# Patient Record
Sex: MALE | Race: WHITE | NOT HISPANIC OR LATINO | Employment: FULL TIME | URBAN - METROPOLITAN AREA
[De-identification: names, ages, dates, MRNs, and addresses within clinical notes are randomized per-mention and may not be internally consistent; named-entity substitution may affect disease eponyms.]

---

## 2019-04-01 NOTE — PROGRESS NOTES
"Electrophysiology Consult     Jay Nash Jr.  1965  [unfilled]  [unfilled]    04/03/19    DATE OF ADMISSION: (Not on file)  [facility] CARDIOLOGY    Augusto Chaney MD  [address] [address] / [facility] [address]    Chief Complaint   Patient presents with   • Irregular Heart Beat     Consult        Problem List:  1. Paroxysmal atrial fibrillation  a. CHADSVASc = 0, on ASA  b. 24-hour Holter monitor 1/30/2018: Predominantly normal sinus rhythm, average heart rate 58 bpm ( bpm)   c. 2 weeks Zio patch 7/5/2018 - 3% AF burden, average HR 53 bpm ( bpm), 5 beat run of NSVT  d. GXT 09/2018 - reportedly negative for ischemia - data deficit  e. Initiated on pill-in-pocket Flecainide  f. Echocardiogram 7/30/18: EF 60%, no significant valvular disease  2. PVC/NSVT  a. Noted on Zio patch, 07/2018, 5 beat run of NSVT  3. BPH  4. Tubular adenoma of rectum  5. Surgical history  a. Oral surgery      History of Present Illness:  Patient is a 53-year-old male with the above-noted past medical history who presents today in consultation by referral from Dr. Chaney for further evaluation of paroxysmal atrial fibrillation and consideration for ablation.  He also saw Dr. Morton at [place] and wishes for a second opinion.  He has noted intermittent episodes of palpitations starting a year and half ago.  He reportedly had episodes of atrial fibrillation noted on a 2 weeks Zio patch in July 2018 but we do not currently have the records of this.  He was initiated on pill in the pocket method of flecainide and is actually now on twice daily dosing scheduled for the last month due to continued episodes.  He underwent stress test in September which was negative for ischemia and had a normal echocardiogram.  Symptoms with atrial fibrillation include palpitations, \"uneasy feeling\" and are described as mildly severe in nature and occurs every 1-2 weeks and can last 10-12 hours in " duration.  Episodes/symptoms exacerbated by nothing and relieved occasionally by having a bowel movement in the morning.  He is a non-smoker, no caffeine, occasional ETOH.  No reflux symptoms.  No hx of HTN.  No s/s suggestive of DAX.  No hx of thyroid problems.  He is taking ASA daily.      No Known Allergies     Cannot display prior to admission medications because the patient has not been admitted in this contact.            Current Outpatient Medications:   •  aspirin 81 MG EC tablet, Take 81 mg by mouth Daily., Disp: , Rfl:   •  Cholecalciferol (VITAMIN D-3) 1000 units capsule, Take 2,000 Units by mouth Daily., Disp: , Rfl:   •  flecainide (TAMBOCOR) 50 MG tablet, Take 50 mg by mouth Every 12 (Twelve) Hours., Disp: , Rfl:   •  Multiple Vitamins-Minerals (MULTIVITAMIN ADULT PO), Take  by mouth Daily., Disp: , Rfl:     Social History     Socioeconomic History   • Marital status:      Spouse name: Not on file   • Number of children: Not on file   • Years of education: Not on file   • Highest education level: Not on file   Tobacco Use   • Smoking status: Never Smoker   • Smokeless tobacco: Never Used   Substance and Sexual Activity   • Alcohol use: Yes   • Drug use: No   • Sexual activity: Defer       Family History:  Mother: , age 79, pulmonary HTN, hx of atrial fibrillation  Father: Alive, age 80, atrial fibrillation    REVIEW OF SYSTEMS:   CONST:  No weight loss, fever, chills, weakness or fatigue.   HEENT:  No visual loss, blurred vision, double vision, yellow sclerae.                   No hearing loss, congestion, sore throat.   SKIN:      No rashes, urticaria, ulcers, sores.     RESP:     No shortness of breath, hemoptysis, cough, sputum.   GI:           No anorexia, nausea, vomiting, diarrhea. No abdominal pain, melena.   :         No burning on urination, hematuria or increased frequency.  ENDO:    No diaphoresis, cold or heat intolerance. No polyuria or polydipsia.   NEURO:  No headache,  "dizziness, + hx of syncope, no paralysis, ataxia, or parasthesias.                  No change in bowel or bladder control. No history of CVA/TIA  MUSC:    No muscle, back pain, joint pain or stiffness.   HEME:    No anemia, bleeding, bruising. No history of DVT/PE.  PSYCH:  No history of depression, anxiety    Vitals:    04/03/19 0849   BP: 112/72   BP Location: Right arm   Patient Position: Sitting   Pulse: 52   Weight: 96.2 kg (212 lb)   Height: 185.4 cm (73\")                 Physical Exam:  GEN: Well nourished, well-developed, no acute distress  HEENT: Normocephalic, atraumatic, PERRLA, moist mucous membranes  NECK: Supple, NO JVD, no thyromegaly, no lymphadenopathy  CARD: S1S2, RRR, no murmur, gallop, rub, PMI NL   LUNGS: Clear to auscultation, normal respiratory effort  ABDOMEN: Soft, nontender, normal bowel sounds  EXTREMITIES: No gross deformities, no clubbing, cyanosis, or edema  SKIN: Warm, dry, no lesions  NEURO: No focal deficits, alert and oriented x 3  PSYCHIATRIC: Normal affect and mood      I personally viewed and interpreted the patient's EKG/Telemetry/lab data      ECG 12 Lead  Date/Time: 4/3/2019 9:01 AM  Performed by: Helio Polanco MD  Authorized by: Helio Polanco MD   Comparison: not compared with previous ECG   Previous ECG: no previous ECG available  Rhythm: sinus bradycardia  BPM: 52                ICD-10-CM ICD-9-CM   1. Paroxysmal atrial fibrillation (CMS/[facility]) I48.0 427.31       Assessment and Plan:   1. Paroxysmal atrial fibrillation:  -Patient with recurrent, symptomatic episodes of paroxysmal atrial fibrillation despite current treatment with flecainide 50 mg bid.  3% burden per last event monitor in July.  Discussed options with patient including increasing Flecainide to 100 mg bid with repeat EKG 48 hours later versus using Flecainide as pill in the pocket.  Pill in the pocket approach would only decrease his duration of episodes but likely will do nothing in terms of his " frequency.  Last option would be to proceed with PVA.  Discussed risks, benefits and probable success rates.  - CHADSVASc = 0, on ASA.  Would need to start NOAC 4 weeks prior to PVA if he wishes to go this route.  Would use Eliquis PRN for any afib episode >1hr.      Scribed for Helio Polanco MD by Flori Fagan, MERA. 4/3/2019  9:02 AM     I, Helio Polanco MD, personally performed the services described in this documentation as scribed by the above named individual in my presence, and it is both accurate and complete.  4/3/2019  10:26 AM

## 2019-04-03 ENCOUNTER — CONSULT (OUTPATIENT)
Dept: CARDIOLOGY | Facility: CLINIC | Age: 54
End: 2019-04-03

## 2019-04-03 VITALS
HEART RATE: 52 BPM | DIASTOLIC BLOOD PRESSURE: 72 MMHG | SYSTOLIC BLOOD PRESSURE: 112 MMHG | WEIGHT: 212 LBS | HEIGHT: 73 IN | BODY MASS INDEX: 28.1 KG/M2

## 2019-04-03 DIAGNOSIS — I48.0 PAROXYSMAL ATRIAL FIBRILLATION (HCC): Primary | ICD-10-CM

## 2019-04-03 PROCEDURE — 99244 OFF/OP CNSLTJ NEW/EST MOD 40: CPT | Performed by: INTERNAL MEDICINE

## 2019-04-03 PROCEDURE — 93000 ELECTROCARDIOGRAM COMPLETE: CPT | Performed by: INTERNAL MEDICINE

## 2019-04-03 RX ORDER — ASPIRIN 81 MG/1
81 TABLET ORAL DAILY
COMMUNITY

## 2019-04-03 RX ORDER — FLECAINIDE ACETATE 50 MG/1
50 TABLET ORAL EVERY 12 HOURS
COMMUNITY
End: 2019-04-03 | Stop reason: ALTCHOICE

## 2019-04-03 RX ORDER — FLECAINIDE ACETATE 100 MG/1
100 TABLET ORAL 2 TIMES DAILY
Qty: 60 TABLET | Refills: 11 | Status: SHIPPED | OUTPATIENT
Start: 2019-04-03 | End: 2020-04-27

## 2019-04-03 RX ORDER — CHOLECALCIFEROL (VITAMIN D3) 25 MCG
2000 CAPSULE ORAL DAILY
COMMUNITY
End: 2020-06-09

## 2019-06-21 ENCOUNTER — TELEPHONE (OUTPATIENT)
Dept: CARDIOLOGY | Facility: CLINIC | Age: 54
End: 2019-06-21

## 2019-06-21 NOTE — TELEPHONE ENCOUNTER
Farrukh Archibald, Cheryl Rep  Karon Max, RN             This patient's medication has been working and he does not want to be on the list for PVA at this time. I advised patient to call office if anything changes. Can you document this in patient's chart please? Thanks

## 2020-04-27 RX ORDER — FLECAINIDE ACETATE 100 MG/1
100 TABLET ORAL 2 TIMES DAILY
Qty: 60 TABLET | Refills: 2 | Status: SHIPPED | OUTPATIENT
Start: 2020-04-27 | End: 2020-07-27 | Stop reason: SDUPTHER

## 2020-06-08 NOTE — PROGRESS NOTES
[facility] at [facility]   OFFICE NOTE      Jay Nash Jr.  1965  PCP: Augusto Chaney MD    SUBJECTIVE:   Jay Nash Jr. is a 54 y.o. male seen for a follow up visit regarding the following:     CC:Afib    HPI:   Pleasant 54-year-old gent presents today follow-up regarding Intermatic atrial fibrillation.  Mr. Nash states since increasing his Tambocor to 100 g every 12 hours he continues to have breakthrough episodes atrial fibrillation occurring once a week.  This typically last a couple hours resolves with some.  When he is in atrial fibrillation he feels irritated and he has palpitations.  He does not always feel like working out when he is in A. fib.  He does take Eliquis when he has episode.  Otherwise he is taking aspirin daily.  He denies any sudden syncope events unrelated.  Occasionally if he is in A. fib try to stand to quickly get a little dizzy.  He has not any chest pain or chest tightness with exertion stressing his pectoris.  He can ride a bike for up to 30-40 miles a day and feels fine without any symptoms.    Cardiac PMH: (Old records have been reviewed and summarized below)  1. Paroxysmal atrial fibrillation  a. CHADSVASc = 0, on ASA  b. 24-hour Holter monitor 1/30/2018: Predominantly normal sinus rhythm, average heart rate 58 bpm ( bpm)   c. 2 weeks Zio patch 7/5/2018 - 3% AF burden, average HR 53 bpm ( bpm), 5 beat run of NSVT  d. GXT 09/2018 - reportedly negative for ischemia - data deficit  e. Initiated on pill-in-pocket Flecainide  f. Echocardiogram 7/30/18: EF 60%, no significant valvular disease  2. PVC/NSVT  a. Noted on Zio patch, 07/2018, 5 beat run of NSVT  3. BPH  4. Tubular adenoma of rectum  5. Surgical history  a. Oral surgery    Past Medical History, Past Surgical History, Family history, Social History, and Medications were all reviewed with the patient today and updated as necessary.       Current Outpatient Medications:   •   "aspirin 81 MG EC tablet, Take 81 mg by mouth Daily., Disp: , Rfl:   •  flecainide (TAMBOCOR) 100 MG tablet, Take 1 tablet by mouth 2 (Two) Times a Day. Please make appointment for further refills., Disp: 60 tablet, Rfl: 2  •  Multiple Vitamins-Minerals (MULTIVITAMIN ADULT PO), Take  by mouth Daily., Disp: , Rfl:   •  silodosin (RAPAFLO) 4 MG capsule capsule, Take 4 mg by mouth Daily With Breakfast., Disp: , Rfl:       No Known Allergies  Patient Active Problem List   Diagnosis   • Paroxysmal atrial fibrillation (CMS/HCC)     Past Medical History:   Diagnosis Date   • Abnormal heart rhythm    • Atrial fibrillation (CMS/HCC)      Past Surgical History:   Procedure Laterality Date   • KNEE SURGERY      LEFT     History reviewed. No pertinent family history.  Social History     Tobacco Use   • Smoking status: Never Smoker   • Smokeless tobacco: Never Used   Substance Use Topics   • Alcohol use: Yes       ROS:  Review of Symptoms:  General: no recent weight loss/gain, weakness or fatigue  Skin: no rashes, lumps, or other skin changes  HEENT: no dizziness, lightheadedness, or vision changes  Respiratory: no cough or hemoptysis  Cardiovascular: no palpitations, and tachycardia  Gastrointestinal: no black/tarry stools or diarrhea  Urinary: no change in frequency or urgency  Peripheral Vascular: no claudication or leg cramps  Musculoskeletal: no muscle or joint pain/stiffness  Psychiatric: no depression or excessive stress  Neurological: no sensory or motor loss, no syncope  Hematologic: no anemia, easy bruising or bleeding  Endocrine: no thyroid problems, nor heat or cold intolerance    PHYSICAL EXAM:    /70 (BP Location: Right arm, Patient Position: Sitting)   Pulse 61   Temp 97.4 °F (36.3 °C)   Ht 185.4 cm (73\")   Wt 94.1 kg (207 lb 6.4 oz)   SpO2 96%   BMI 27.36 kg/m²        Wt Readings from Last 5 Encounters:   06/09/20 94.1 kg (207 lb 6.4 oz)   04/03/19 96.2 kg (212 lb)       BP Readings from Last 5 " Encounters:   06/09/20 124/70   04/03/19 112/72       General appearance - Alert, well appearing, and in no distress   Mental status - Affect appropriate to mood.  Eyes - Sclerae anicteric,  ENMT - Hearing grossly normal bilaterally, Dental hygiene good.  Neck - Carotids upstroke normal bilaterally, no bruits, no JVD.  Resp - Clear to auscultation, no wheezes, rales or rhonchi, symmetric air entry.  Heart - Normal rate, regular rhythm, normal S1, S2, no murmurs, rubs, clicks or gallops.  GI - Soft, nontender, nondistended, no masses or organomegaly.  Neurological - Grossly intact - normal speech, no focal findings  Musculoskeletal - No joint tenderness, deformity or swelling, no muscular tenderness noted.  Extremities - Peripheral pulses normal, no pedal edema, no clubbing or cyanosis.  Skin - Normal coloration and turgor.  Psych -  oriented to person, place, and time.    Medical problems and test results were reviewed with the patient today.     No results found for this or any previous visit (from the past 672 hour(s)).      EKG: (EKG has been independently visualized by me and summarized below)    ECG 12 Lead  Date/Time: 6/9/2020 4:27 PM  Performed by: Martín Barajas PA  Authorized by: Martín Barajas PA   Comparison: compared with previous ECG from 4/3/2019  Similar to previous ECG  Rhythm: sinus rhythm  Rate: normal  Conduction: conduction normal  ST Segments: ST segments normal  T Waves: T waves normal  QRS axis: normal  Other: no other findings    Clinical impression: normal ECG              ASSESSMENT   1. PaF: Flecainide  100mg BID. EKG stable.   2. Chadsvasc=0, Asa daily. PRN Eliquis.     PLAN  · Long discussion with patient regarding options of treatment of symptomatic atrial fibrillation.  He continues to have breakthrough episodes of 100 mg flecainide twice daily.  We Discussed in detail the risk and benefits of a pulmonary vein ablation procedure.  He like to consider this in the near future.     · Return for Dr. Polanco in 6 months or sooner as needed.      6/9/2020  15:30    Will Jenn LOONEY

## 2020-06-09 ENCOUNTER — OFFICE VISIT (OUTPATIENT)
Dept: CARDIOLOGY | Facility: CLINIC | Age: 55
End: 2020-06-09

## 2020-06-09 VITALS
WEIGHT: 207.4 LBS | DIASTOLIC BLOOD PRESSURE: 70 MMHG | OXYGEN SATURATION: 96 % | BODY MASS INDEX: 27.49 KG/M2 | HEART RATE: 61 BPM | TEMPERATURE: 97.4 F | SYSTOLIC BLOOD PRESSURE: 124 MMHG | HEIGHT: 73 IN

## 2020-06-09 DIAGNOSIS — I48.0 PAROXYSMAL ATRIAL FIBRILLATION (HCC): Primary | ICD-10-CM

## 2020-06-09 PROCEDURE — 93000 ELECTROCARDIOGRAM COMPLETE: CPT | Performed by: PHYSICIAN ASSISTANT

## 2020-06-09 PROCEDURE — 99214 OFFICE O/P EST MOD 30 MIN: CPT | Performed by: PHYSICIAN ASSISTANT

## 2020-06-09 RX ORDER — SILODOSIN 4 MG/1
4 CAPSULE ORAL
COMMUNITY
End: 2021-10-29

## 2020-06-22 DIAGNOSIS — I48.0 PAROXYSMAL ATRIAL FIBRILLATION (HCC): Primary | ICD-10-CM

## 2020-07-27 RX ORDER — FLECAINIDE ACETATE 100 MG/1
100 TABLET ORAL 2 TIMES DAILY
Qty: 60 TABLET | Refills: 6 | Status: SHIPPED | OUTPATIENT
Start: 2020-07-27 | End: 2020-08-11 | Stop reason: SDUPTHER

## 2020-08-11 RX ORDER — FLECAINIDE ACETATE 100 MG/1
100 TABLET ORAL 2 TIMES DAILY
Qty: 180 TABLET | Refills: 3 | Status: SHIPPED | OUTPATIENT
Start: 2020-08-11 | End: 2021-07-20

## 2021-01-27 ENCOUNTER — OFFICE VISIT (OUTPATIENT)
Dept: CARDIOLOGY | Facility: CLINIC | Age: 56
End: 2021-01-27

## 2021-01-27 VITALS
OXYGEN SATURATION: 99 % | HEART RATE: 51 BPM | BODY MASS INDEX: 29.1 KG/M2 | WEIGHT: 219.6 LBS | HEIGHT: 73 IN | DIASTOLIC BLOOD PRESSURE: 64 MMHG | SYSTOLIC BLOOD PRESSURE: 118 MMHG | TEMPERATURE: 96.2 F

## 2021-01-27 DIAGNOSIS — I49.3 PVC (PREMATURE VENTRICULAR CONTRACTION): ICD-10-CM

## 2021-01-27 DIAGNOSIS — I48.0 PAROXYSMAL ATRIAL FIBRILLATION (HCC): Primary | ICD-10-CM

## 2021-01-27 PROCEDURE — 99213 OFFICE O/P EST LOW 20 MIN: CPT | Performed by: INTERNAL MEDICINE

## 2021-01-27 PROCEDURE — 93000 ELECTROCARDIOGRAM COMPLETE: CPT | Performed by: INTERNAL MEDICINE

## 2021-01-27 RX ORDER — DOXAZOSIN MESYLATE 4 MG/1
4 TABLET ORAL NIGHTLY
COMMUNITY

## 2021-01-27 NOTE — PROGRESS NOTES
Jay Nash Jr.  1965  [phone]    01/27/2021    [facility] CARDIOLOGY     Referring Provider: No ref. provider found     Augusto Chaney MD  [address] [address]    Chief Complaint   Patient presents with   • Atrial Fibrillation       Problem List:   1. Paroxysmal atrial fibrillation  a. CHADSVASc = 0, on ASA  b. 24-hour Holter monitor 1/30/2018: Predominantly normal sinus rhythm, average heart rate 58 bpm ( bpm)   c. 2 weeks Zio patch 7/5/2018 - 3% AF burden, average HR 53 bpm ( bpm), 5 beat run of NSVT  d. GXT 09/2018 - reportedly negative for ischemia - data deficit  e. Initiated on pill-in-pocket Flecainide  f. Echocardiogram 7/30/18: EF 60%, no significant valvular disease  2. PVC/NSVT  a. Noted on Zio patch, 07/2018, 5 beat run of NSVT  3. BPH  4. Tubular adenoma of rectum  5. Surgical history  a. Oral surgery      Allergies  No Known Allergies    Current Medications    Current Outpatient Medications:   •  aspirin 81 MG EC tablet, Take 81 mg by mouth Daily., Disp: , Rfl:   •  doxazosin (Cardura) 4 MG tablet, Take 4 mg by mouth Every Night., Disp: , Rfl:   •  flecainide (TAMBOCOR) 100 MG tablet, Take 1 tablet by mouth 2 (Two) Times a Day., Disp: 180 tablet, Rfl: 3  •  Multiple Vitamins-Minerals (MULTIVITAMIN ADULT PO), Take  by mouth Daily., Disp: , Rfl:   •  silodosin (RAPAFLO) 4 MG capsule capsule, Take 4 mg by mouth Daily With Breakfast., Disp: , Rfl:     History of Present Illness     Pt presents for follow up of AF/PVC. Since we last saw the pt, pt denies any SOB, CP, LH, and dizziness. Denies any hospitalizations, ER visits, bleeding, or TIA/CVA symptoms. Overall feels well. No episode of AF for past 7-8 months. Presently being treated with omeprazole 40 mg po daily for gastritis    ROS:  General:  Denies fatigue, weight gain or loss  Cardiovascular:  Denies CP, PND, syncope, near syncope, edema or palpitations.  Pulmonary:   "Denies VILLALBA, cough, or wheezing      Vitals:    01/27/21 1511   BP: 118/64   BP Location: Left arm   Patient Position: Sitting   Pulse: 51   Temp: 96.2 °F (35.7 °C)   SpO2: 99%   Weight: 99.6 kg (219 lb 9.6 oz)   Height: 185.4 cm (72.99\")     Body mass index is 28.98 kg/m².  PE:  General: NAD  Neck: no JVD, no carotid bruits, no TM  Heart RRR, NL S1, S2, S4 present, no rubs, murmurs  Lungs: CTA, no wheezes, rhonchi, or rales  Abd: soft, non-tender, NL BS  Ext: No musculoskeletal deformities, no edema, cyanosis, or clubbing  Psych: normal mood and affect    Diagnostic Data:        ECG 12 Lead    Date/Time: 1/27/2021 3:16 PM  Performed by: Helio Polanco MD  Authorized by: Helio Polanco MD   Comparison: compared with previous ECG from 6/9/2020  Similar to previous ECG  Rhythm: sinus bradycardia  BPM: 50              1. Paroxysmal atrial fibrillation (CMS/HCC)    2. PVC (premature ventricular contraction)          Plan:  1. PAF: Flecainide  100mg BID. EKG stable. Well controlled overall  2. PVC: stable  3. Chadsvasc=0, ASA daily. PRN Eliquis.    F/up in 12 months      "

## 2021-07-20 RX ORDER — FLECAINIDE ACETATE 100 MG/1
TABLET ORAL
Qty: 180 TABLET | Refills: 1 | Status: SHIPPED | OUTPATIENT
Start: 2021-07-20 | End: 2022-01-17

## 2021-08-02 ENCOUNTER — OFFICE VISIT (OUTPATIENT)
Dept: CARDIOLOGY | Facility: CLINIC | Age: 56
End: 2021-08-02

## 2021-08-02 VITALS
OXYGEN SATURATION: 96 % | WEIGHT: 211 LBS | BODY MASS INDEX: 27.08 KG/M2 | DIASTOLIC BLOOD PRESSURE: 62 MMHG | HEART RATE: 54 BPM | SYSTOLIC BLOOD PRESSURE: 102 MMHG | HEIGHT: 74 IN

## 2021-08-02 DIAGNOSIS — I48.0 PAROXYSMAL ATRIAL FIBRILLATION (HCC): Primary | ICD-10-CM

## 2021-08-02 PROCEDURE — 93000 ELECTROCARDIOGRAM COMPLETE: CPT | Performed by: PHYSICIAN ASSISTANT

## 2021-08-02 PROCEDURE — 99214 OFFICE O/P EST MOD 30 MIN: CPT | Performed by: PHYSICIAN ASSISTANT

## 2021-08-02 NOTE — PROGRESS NOTES
[facility] at [facility]   OFFICE NOTE      Jay Nash Jr.  1965  PCP: Augusto Chaney MD    SUBJECTIVE:   Jay Nash Jr. is a 55 y.o. male seen for a follow up visit regarding the following:     CC:Afib    HPI:   Since we last saw the pt, pt denies any AF episodes, SOB, CP, LH, and dizziness. Denies any hospitalizations, ER visits, bleeding, or TIA/CVA symptoms. Overall feels well.  He cycles 25 to 50 miles per week.  He has had no symptoms while cycling.  Tolerating flecainide therapy well.    Cardiac PMH: (Old records have been reviewed and summarized below)  1. Paroxysmal atrial fibrillation  a. CHADSVASc = 0, on ASA  b. 24-hour Holter monitor 1/30/2018: Predominantly normal sinus rhythm, average heart rate 58 bpm ( bpm)   c. 2 weeks Zio patch 7/5/2018 - 3% AF burden, average HR 53 bpm ( bpm), 5 beat run of NSVT  d. GXT 09/2018 - reportedly negative for ischemia - data deficit  e. Initiated on pill-in-pocket Flecainide  f. Echocardiogram 7/30/18: EF 60%, no significant valvular disease  2. PVC/NSVT  a. Noted on Zio patch, 07/2018, 5 beat run of NSVT  3. BPH  4. Tubular adenoma of rectum  5. Surgical history  a. Oral surgery          Past Medical History, Past Surgical History, Family history, Social History, and Medications were all reviewed with the patient today and updated as necessary.       Current Outpatient Medications:   •  aspirin 81 MG EC tablet, Take 81 mg by mouth Daily., Disp: , Rfl:   •  doxazosin (Cardura) 4 MG tablet, Take 4 mg by mouth Every Night., Disp: , Rfl:   •  flecainide (TAMBOCOR) 100 MG tablet, TAKE 1 TABLET TWICE A DAY, Disp: 180 tablet, Rfl: 1  •  Multiple Vitamins-Minerals (MULTIVITAMIN ADULT PO), Take  by mouth Daily., Disp: , Rfl:   •  silodosin (RAPAFLO) 4 MG capsule capsule, Take 4 mg by mouth Daily With Breakfast., Disp: , Rfl:       No Known Allergies  Patient Active Problem List   Diagnosis   • Paroxysmal atrial fibrillation  "(CMS/HCC)     Past Medical History:   Diagnosis Date   • Abnormal heart rhythm    • Atrial fibrillation (CMS/HCC)      Past Surgical History:   Procedure Laterality Date   • KNEE SURGERY      LEFT     History reviewed. No pertinent family history.  Social History     Tobacco Use   • Smoking status: Never Smoker   • Smokeless tobacco: Never Used   Substance Use Topics   • Alcohol use: Yes       ROS:  Review of Symptoms:  General: no recent weight loss/gain, weakness or fatigue  Skin: no rashes, lumps, or other skin changes  HEENT: no dizziness, lightheadedness, or vision changes  Respiratory: no cough or hemoptysis  Cardiovascular: no palpitations, and tachycardia  Gastrointestinal: no black/tarry stools or diarrhea  Urinary: no change in frequency or urgency  Peripheral Vascular: no claudication or leg cramps  Musculoskeletal: no muscle or joint pain/stiffness  Psychiatric: no depression or excessive stress  Neurological: no sensory or motor loss, no syncope  Hematologic: no anemia, easy bruising or bleeding  Endocrine: no thyroid problems, nor heat or cold intolerance    PHYSICAL EXAM:    /62 (BP Location: Left arm, Patient Position: Sitting)   Pulse 54   Ht 188 cm (74\")   Wt 95.7 kg (211 lb)   SpO2 96%   BMI 27.09 kg/m²        Wt Readings from Last 5 Encounters:   08/02/21 95.7 kg (211 lb)   01/27/21 99.6 kg (219 lb 9.6 oz)   06/09/20 94.1 kg (207 lb 6.4 oz)   04/03/19 96.2 kg (212 lb)       BP Readings from Last 5 Encounters:   08/02/21 102/62   01/27/21 118/64   06/09/20 124/70   04/03/19 112/72       General appearance - Alert, well appearing, and in no distress   Mental status - Affect appropriate to mood.  Eyes - Sclerae anicteric,  ENMT - Hearing grossly normal bilaterally, Dental hygiene good.  Neck - Carotids upstroke normal bilaterally, no bruits, no JVD.  Resp - Clear to auscultation, no wheezes, rales or rhonchi, symmetric air entry.  Heart - Normal rate, regular rhythm, normal S1, S2, no " murmurs, rubs, clicks or gallops.  GI - Soft, nontender, nondistended, no masses or organomegaly.  Neurological - Grossly intact - normal speech, no focal findings  Musculoskeletal - No joint tenderness, deformity or swelling, no muscular tenderness noted.  Extremities - Peripheral pulses normal, no pedal edema, no clubbing or cyanosis.  Skin - Normal coloration and turgor.  Psych -  oriented to person, place, and time.    Medical problems and test results were reviewed with the patient today.     No results found for this or any previous visit (from the past 672 hour(s)).      EKG: (EKG has been independently visualized by me and summarized below)    ECG 12 Lead    Date/Time: 8/2/2021 12:07 PM  Performed by: Martín Barajas PA  Authorized by: Martín Barajas PA   Rhythm: sinus rhythm  Rate: normal  Conduction: 1st degree AV block  ST Segments: ST segments normal  QRS axis: normal  Other: no other findings    Clinical impression: normal ECG            ASSESSMENT   1. PAF: Flecainide 100mg BID. EKG stable. First degree AVB  2. PVC's Stable  3. Cahdsvasc=o, Asa daily.     PLAN  · Continue current medical therapy, EKG stable on flecainide use  · Return for follow-up in 6 months with Dr. Polanco scheduled    8/2/2021  09:39 EDT  Will Jenn LOONEY

## 2021-10-29 ENCOUNTER — OFFICE VISIT (OUTPATIENT)
Dept: ORTHOPEDIC SURGERY | Facility: CLINIC | Age: 56
End: 2021-10-29

## 2021-10-29 VITALS — WEIGHT: 202.6 LBS | BODY MASS INDEX: 26 KG/M2 | OXYGEN SATURATION: 98 % | HEART RATE: 51 BPM | HEIGHT: 74 IN

## 2021-10-29 DIAGNOSIS — M77.41 METATARSALGIA OF RIGHT FOOT: Primary | ICD-10-CM

## 2021-10-29 PROCEDURE — 99204 OFFICE O/P NEW MOD 45 MIN: CPT | Performed by: ORTHOPAEDIC SURGERY

## 2021-10-29 RX ORDER — MELOXICAM 15 MG/1
15 TABLET ORAL DAILY
Qty: 30 TABLET | Refills: 5 | Status: SHIPPED | OUTPATIENT
Start: 2021-10-29 | End: 2023-01-11

## 2021-10-29 NOTE — PROGRESS NOTES
NEW PATIENT    Patient: Jay Nash Jr.  : 1965    Primary Care Provider: Augusto Chaney MD    Requesting Provider: As above    Pain of the Right Foot      History    Chief Complaint: Right foot pain    History of Present Illness: This is a very pleasant 56-year-old gentleman who has about a 1 year history of right forefoot pain.  He reports that gets much worse barefoot, feels better with padding, it feels as if he is walking on something.  He did not have any injury, he thinks it might have started secondary to long distance bicycling.  He has tried various types of pads, he has a silicone Budin splint type pad that helps a little bit.  He reports that if he wears his sandals that have a gel liner he has no symptoms.  He has been seen by podiatry and thought to have 2 neuromas.  He had 1 injection in the second webspace by history, he reports it did not help seem to make it worse.  He has an MRI on disc that I reviewed and reviewed the report.  It is dated 2020.  It shows moderate hallux valgus with some arthritic change in the first MTPJ, I do not see a neuroma, there is slight inflammation in the soft tissues around the second and third MTPJ.  This is my interpretation.  He is here for another opinion.  He has not had any custom orthotics.  He is an  and .    Current Outpatient Medications on File Prior to Visit   Medication Sig Dispense Refill   • aspirin 81 MG EC tablet Take 81 mg by mouth Daily.     • doxazosin (Cardura) 4 MG tablet Take 4 mg by mouth Every Night.     • flecainide (TAMBOCOR) 100 MG tablet TAKE 1 TABLET TWICE A  tablet 1   • Multiple Vitamins-Minerals (MULTIVITAMIN ADULT PO) Take  by mouth Daily.     • [DISCONTINUED] silodosin (RAPAFLO) 4 MG capsule capsule Take 4 mg by mouth Daily With Breakfast.       No current facility-administered medications on file prior to visit.      No Known Allergies   Past Medical History:   Diagnosis Date   •  "Abnormal heart rhythm    • Atrial fibrillation (HCC)      Past Surgical History:   Procedure Laterality Date   • KNEE SURGERY      LEFT     No family history on file.   Social History     Socioeconomic History   • Marital status:    Tobacco Use   • Smoking status: Never Smoker   • Smokeless tobacco: Never Used   Substance and Sexual Activity   • Alcohol use: Yes   • Drug use: No   • Sexual activity: Defer        Review of Systems   Constitutional: Negative.    HENT: Negative.    Eyes: Negative.    Respiratory: Negative.    Cardiovascular: Negative.    Gastrointestinal: Negative.    Endocrine: Negative.    Genitourinary: Negative.    Musculoskeletal: Positive for arthralgias.   Skin: Negative.    Allergic/Immunologic: Negative.    Neurological: Negative.    Hematological: Negative.    Psychiatric/Behavioral: Negative.        The following portions of the patient's history were reviewed and updated as appropriate: allergies, current medications, past family history, past medical history, past social history, past surgical history and problem list.    Physical Exam:   Pulse 51   Ht 188 cm (74.02\")   Wt 91.9 kg (202 lb 9.6 oz)   SpO2 98%   BMI 26.00 kg/m²   GENERAL: Body habitus: normal weight for height    Lower extremity edema: Right: none; Left: none    Varicose veins:  Right: none; Left: none    Gait: normal     Mental Status:  awake and alert; oriented to person, place, and time    Voice:  clear  SKIN:  Lower extremity: Normal    Hair Growth(lower extremity):  Right:normal; Left:  normal  NAILS: Toenails: normal  HEENT: Head: Normocephalic, atraumatic,  without obvious abnormality.  eye: normal external eye, no icterus  ears:normal external ears  PULM:  Repiratory effort normal  CV:  Dorsalis Pedis:  Right: 2+; Left:2+    Posterior Tibial: Right:2+; Left:2+    Capillary Refill:  Brisk  MSK:  Hand:sensation intact      Tibia:  Right:  non tender; Left:  non tender      Ankle:  Right: non tender, ROM  " normal and motor function  normal; Left:  non tender, ROM  normal and motor function  normal      Foot:  Right:  Moderate hallux valgus metatarsus primus varus, no tenderness over the bunion, second and third toes are longer than the great toe, slight flexion contracture at the second and third toe PIP joint, tender to palpation in the second and third metatarsal phalangeal joints, third more tender than the second, no significant swelling, no instability, otherwise nontender; Left:  No tenderness, similar long second and third toes, much less hallux valgus metatarsus primus varus      NEURO: Heel Walking:  Right:  normal; Left:  normal    Toe Walking:  Right:  Able to do this with pain in the second and third metatarsal phalangeal joints; Left:  normal     [place] 5.07 monofilament test: normal    Lower extremity sensation: intact       Calf Atrophy:none    Motor Function: all 5/5         Medical Decision Making    Data Review:   reviewed radiology images, reviewed radiology results and reviewed outside records    Assessment and Plan/ Diagnosis/Treatment options:   1. Metatarsalgia of right foot  I explained to him I do not think this is a neuroma.  This is metatarsalgia.  I explained to the patient that this is a very common problem, it is commonly hereditary but is made worse by shoe wear.  It commonly presents as a feeling of walking on a marble, or a rock.  It is generally worse barefoot, feels better with padding .  It can be hard to distinguish this from neuroma pain, but neuroma pain is generally worse in a shoe and better barefoot.    If untreated, it can lead to severe deformity of the toe.      I explained the problem to the patient, that the swelling in the joints leads to loosening of the ligaments and gradual deformity.  I explained that the toe can move directly up or sideways and become a crossover toe.  I explained we can call this synovitis, metatarsalgia, early hammertoe, early crossover  toe, the result is the same.  The goal of treatment is to prevent severe deformity.  We cannot change the deformity that has already occurred, but we can keep it from getting worse.  I explained that I generally tell patients that I do not do an MRI for this, but since he had an MRI already done and it shows the mild inflammation around the joints as expected.    I explained it can take 6-12 months for the pain to resolve.  When the pain is gone, the toe will no longer deform.    The treatment is done in a step-wise fashion.  The first stage is three fold: 1. splinting the joint, I showed them how to use either tape or a 2 loop Budin splint.  They should experiment and use whichever is comfortable.  2. custom orthotic to relieve pressure on the joint- I gave them a prescription 3. antiinflammatory to decrease the inflammation, Mobic.  I also placed a metatarsal pad in his shoe he initially thought that it was painful I used a large pad, I gave him a small and medium pad to try instead, I explained he should adjusted until it is comfortable for metatarsal posting.    The second stage is a cortisone injection I do not do this the first time because of the increased risk that the toe will dislocate after injection.  He has already had an injection in the webspace I suspect not the joint.  I explained that sometimes these joints can spontaneously dislocate.   .      Surgery is only a very last resort.      I will see him in 3 to 4 months standing 2 views of the foot        Radiology Ordered []  Radiology Reports Reviewed []      Radiology Images Reviewed []   Labs Reviewed []    Labs Ordered []   PCP Records Reviewed []    Provider Records Reviewed []    ER Records Reviewed []    Hospital Records Reviewed []    History Obtained From Family []    Phone conversation with Provider []    Records Requested []        Wanda Grant MD

## 2022-01-17 RX ORDER — FLECAINIDE ACETATE 100 MG/1
TABLET ORAL
Qty: 180 TABLET | Refills: 3 | Status: SHIPPED | OUTPATIENT
Start: 2022-01-17 | End: 2022-08-25 | Stop reason: DRUGHIGH

## 2022-02-02 ENCOUNTER — OFFICE VISIT (OUTPATIENT)
Dept: ORTHOPEDIC SURGERY | Facility: CLINIC | Age: 57
End: 2022-02-02

## 2022-02-02 VITALS
BODY MASS INDEX: 27.7 KG/M2 | HEIGHT: 74 IN | DIASTOLIC BLOOD PRESSURE: 82 MMHG | WEIGHT: 215.8 LBS | SYSTOLIC BLOOD PRESSURE: 124 MMHG

## 2022-02-02 DIAGNOSIS — M77.41 METATARSALGIA OF RIGHT FOOT: Primary | ICD-10-CM

## 2022-02-02 PROCEDURE — 99213 OFFICE O/P EST LOW 20 MIN: CPT | Performed by: ORTHOPAEDIC SURGERY

## 2022-02-02 NOTE — PROGRESS NOTES
ESTABLISHED PATIENT    Patient: Jay Nash Jr.  : 1965    Primary Care Provider: Augusto Chaney MD    Requesting Provider: As above    Follow-up (3 month f/u Metatarsalgia of right foot)      History    Chief Complaint: Right forefoot pain    History of Present Illness: He returns for follow-up of his right forefoot pain, he reports only a little improvement sometimes.  He did get the custom orthotics.  He felt great when he was taping the toes.  Once he got the orthotics he stopped taping the toes.  I explained that he needs to continue taping the toes with the orthotics.  He is still taking anti-inflammatory medicine.  He notes that the orthotic makes his left foot tired.  The right orthotic is comfortable, but he is not entirely certain the metatarsal pad is in the correct place.  I encouraged him to go back to the orthotist to have it adjusted.  He is somewhat discouraged that the pain is not all gone.  I explained to him that he can take 9 to 12 months of doing the appropriate treatment for this to improve.  The vast majority of patients however to get better.    Current Outpatient Medications on File Prior to Visit   Medication Sig Dispense Refill   • aspirin 81 MG EC tablet Take 81 mg by mouth Daily.     • doxazosin (Cardura) 4 MG tablet Take 4 mg by mouth Every Night.     • flecainide (TAMBOCOR) 100 MG tablet TAKE 1 TABLET TWICE A  tablet 3   • meloxicam (MOBIC) 15 MG tablet Take 1 tablet by mouth Daily. 30 tablet 5   • Multiple Vitamins-Minerals (MULTIVITAMIN ADULT PO) Take  by mouth Daily.       No current facility-administered medications on file prior to visit.      No Known Allergies   Past Medical History:   Diagnosis Date   • Abnormal heart rhythm    • Atrial fibrillation (HCC)      Past Surgical History:   Procedure Laterality Date   • KNEE SURGERY      LEFT     History reviewed. No pertinent family history.   Social History     Socioeconomic History   • Marital status:  "   Tobacco Use   • Smoking status: Never Smoker   • Smokeless tobacco: Never Used   Substance and Sexual Activity   • Alcohol use: Yes   • Drug use: No   • Sexual activity: Defer        Review of Systems   Constitutional: Negative.    HENT: Negative.    Eyes: Negative.    Respiratory: Negative.    Cardiovascular: Negative.    Gastrointestinal: Negative.    Endocrine: Negative.    Genitourinary: Negative.    Musculoskeletal: Positive for arthralgias.   Skin: Negative.    Allergic/Immunologic: Negative.    Neurological: Negative.    Hematological: Negative.    Psychiatric/Behavioral: Negative.        The following portions of the patient's history were reviewed and updated as appropriate: allergies, current medications, past family history, past medical history, past social history, past surgical history and problem list.    Physical Exam:   /82   Ht 188 cm (74.02\")   Wt 97.9 kg (215 lb 12.8 oz)   BMI 27.70 kg/m²   GENERAL: Body habitus: normal weight for height    Lower extremity edema: Left: none; Right: none    Gait: normal     Mental Status:  awake and alert; oriented to person, place, and time  MSK:  T        Foot:  Right:  Mildly tender in the second, third, fourth metatarsal phalangeal joint, no change in the mild flexible hammertoes, no change in the bunion, no tenderness over the bunion, no tenderness in the webspaces;     NEURO Sensation:  intact    Medical Decision Making    Data Review:   ordered and reviewed x-rays today    Assessment/Plan/Diagnosis/Treatment Options:   1. Metatarsalgia of right foot  As above I reassured him this generally takes time.  Surgery is only a last resort.  If it came to surgery it would be a very significant operation including straighten the bunion and the 3 toes.  He notes that the toes feel swollen.  They do not have the appearance of sausage digits, I explained that sometimes psoriatic arthritis will present with sausage digits but I do not see that on his " toes.  I think this is metatarsalgia, synovitis in the second, third, fourth metatarsal phalangeal joint.  I recommend taping the toes along with the orthotic.  I will see him in 4 months.  No x-rays at that time unless he has new deformity  - XR Foot 2 View Right        Wanda Grant MD

## 2022-02-16 ENCOUNTER — OFFICE VISIT (OUTPATIENT)
Dept: CARDIOLOGY | Facility: CLINIC | Age: 57
End: 2022-02-16

## 2022-02-16 VITALS
HEART RATE: 44 BPM | SYSTOLIC BLOOD PRESSURE: 118 MMHG | OXYGEN SATURATION: 97 % | WEIGHT: 214 LBS | BODY MASS INDEX: 27.46 KG/M2 | DIASTOLIC BLOOD PRESSURE: 68 MMHG | HEIGHT: 74 IN

## 2022-02-16 DIAGNOSIS — I49.3 PVC (PREMATURE VENTRICULAR CONTRACTION): ICD-10-CM

## 2022-02-16 DIAGNOSIS — I48.0 PAROXYSMAL ATRIAL FIBRILLATION: Primary | ICD-10-CM

## 2022-02-16 DIAGNOSIS — R00.1 BRADYCARDIA, SINUS: ICD-10-CM

## 2022-02-16 PROCEDURE — 99213 OFFICE O/P EST LOW 20 MIN: CPT | Performed by: INTERNAL MEDICINE

## 2022-02-16 PROCEDURE — 93000 ELECTROCARDIOGRAM COMPLETE: CPT | Performed by: NURSE PRACTITIONER

## 2022-02-16 NOTE — PROGRESS NOTES
Jay Nash Jr.  1965  [phone]    02/16/2022    [facility] CARDIOLOGY     Referring Provider: No ref. provider found     Augusto Chaney MD  [id number] [address]    Chief Complaint   Patient presents with   • Paroxysmal atrial fibrillation (CMS/HCC)     Problem List:   1. Paroxysmal atrial fibrillation  a. CHADSVASc = 0, on ASA  b. 24-hour Holter monitor 1/30/2018: Predominantly normal sinus rhythm, average heart rate 58 bpm ( bpm)   c. 2 weeks Zio patch 7/5/2018 - 3% AF burden, average HR 53 bpm ( bpm), 5 beat run of NSVT  d. GXT 09/2018 - reportedly negative for ischemia - data deficit  e. Initiated on pill-in-pocket Flecainide  f. Echocardiogram 7/30/18: EF 60%, no significant valvular disease  2. PVC/NSVT  a. Noted on Zio patch, 07/2018, 5 beat run of NSVT  3. BPH  4. Tubular adenoma of rectum  5. Surgical history  a. Oral surgery     Allergies  No Known Allergies    Current Medications    Current Outpatient Medications:   •  aspirin 81 MG EC tablet, Take 81 mg by mouth Daily., Disp: , Rfl:   •  doxazosin (Cardura) 4 MG tablet, Take 4 mg by mouth Every Night., Disp: , Rfl:   •  flecainide (TAMBOCOR) 100 MG tablet, TAKE 1 TABLET TWICE A DAY, Disp: 180 tablet, Rfl: 3  •  meloxicam (MOBIC) 15 MG tablet, Take 1 tablet by mouth Daily., Disp: 30 tablet, Rfl: 5  •  Multiple Vitamins-Minerals (MULTIVITAMIN ADULT PO), Take  by mouth Daily., Disp: , Rfl:     History of Present Illness     Pt presents for follow up of AF, PVCs. Since we last saw the pt he had done well. No episodes since increasing flecainide twice daily dosing.  pt denies any AF episodes, SOB, CP, LH, and dizziness. Denies any hospitalizations, ER visits, bleeding, or TIA/CVA symptoms. Overall feels well. He still does quite a bit of cycling and feels well with exercise.     ROS:  General:  + ongoing fatigue, weight gain or loss  Cardiovascular:  Denies CP, PND, syncope, near syncope,  "edema or palpitations.  Pulmonary:  Denies VILLALBA, cough, or wheezing      Vitals:    02/16/22 1559   BP: 118/68   BP Location: Left arm   Patient Position: Sitting   Pulse: (!) 44   SpO2: 97%   Weight: 97.1 kg (214 lb)   Height: 188 cm (74\")     Body mass index is 27.48 kg/m².  PE:  General: NAD  Neck: no JVD, no carotid bruits, no TM  Heart RRR, NL S1, S2, S4 present, no rubs, murmurs  Lungs: CTA, no wheezes, rhonchi, or rales  Abd: soft, non-tender, NL BS  Ext: No musculoskeletal deformities, no edema, cyanosis, or clubbing  Psych: normal mood and affect    Diagnostic Data:        ECG 12 Lead    Date/Time: 2/16/2022 4:05 PM  Performed by: Suzanne Santacruz APRN  Authorized by: Suzanne Santacruz APRN   Comparison: compared with previous ECG from 8/2/2021  Similar to previous ECG  Rhythm: sinus bradycardia  BPM: 44  Comments: acceptable QRS            1. Paroxysmal atrial fibrillation (HCC)    2. PVC (premature ventricular contraction)    3. Bradycardia, sinus      Plan:  1. PAF: Flecainide 100mg BID. EKG stable. No episodes in the last year   2. PVC: stable, no symptoms   3. Chadsvasc=0, ASA daily. PRN Eliquis  4. 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 2/16/2022  16:21 EST     I, Helio Polanco MD, personally performed the services described in this documentation as scribed by the above named individual in my presence, and it is both accurate and complete.  2/16/2022  16:21 EST        "

## 2022-05-19 ENCOUNTER — TELEMEDICINE (OUTPATIENT)
Dept: SLEEP MEDICINE | Facility: HOSPITAL | Age: 57
End: 2022-05-19

## 2022-05-19 DIAGNOSIS — E66.3 OVERWEIGHT: ICD-10-CM

## 2022-05-19 DIAGNOSIS — G47.33 OBSTRUCTIVE SLEEP APNEA, ADULT: ICD-10-CM

## 2022-05-19 DIAGNOSIS — R06.83 SNORING: Primary | ICD-10-CM

## 2022-05-19 PROCEDURE — 99202 OFFICE O/P NEW SF 15 MIN: CPT | Performed by: INTERNAL MEDICINE

## 2022-06-08 NOTE — PROGRESS NOTES
Chief Complaint  Snoring and possible sleep disordered breathing    Subjective        Jay Nash Jr. presents to [facility] SLEEP [facility] for the evaluation of snoring and possible sleep disordered breathing.  His primary care physician is Dr. Chaney.  You have chosen to receive care through a telehealth visit.  Do you consent to use a video/audio connection for your medical care today? Yes  History of Present Illness  Patient's had a history of snoring for some time.  He estimates that it 15 years.  Its been worse in the past few months.  He denies any noted apneas.  He has several family members diagnosed with obstructive sleep apnea.  He says his Fitbit shows that he has oxygen desaturations at night.  He denies awakening gasping for breath.  He is sometimes rested and morning but often not.  He denies morning headaches.    He denies awakening with dry mouth.  He has broken his nose previously.  He denies having trouble breathing through his nose.  He denies being sleepy during the day.  He denies kicking or jerking his legs at night.  He has some occasional back pain at night but does not think it keeps him awake.  He has had atrial fibrillation known for the past 4 years.    He goes to bed between 9 PM and 10 PM.  He will fall asleep in 2 to 15 minutes.  He awakens 2-3 times during the night.  He thinks he gets about 7 hours of sleep and is sometimes rested.  He denies any history of hypertension or diabetes.  He has been diagnosed with atrial fibrillation.    's past medical history:    Allergies: Without    Habits: Smoking: Without    Ethanol: He has 1 drink 2-3 times per week    Caffeine: he has an occasional cola    Medical illnesses: He has atrial fibrillation    Medications: Flecainide, doxazosin, multivitamins, aspirin,    Surgeries: Had shoulder surgery, knee arthroscopy, wisdom teeth extraction    Family history positive for sleep apnea and cancer.    Review of systems:  "Positives include dental problems, urinary urgency.  Other systems are reviewed and reported as negative.    Terreton score is 5/24  Objective   Vital Signs:  There were no vitals taken for this visit.  Estimated body mass index is 27.48 kg/m² as calculated from the following:    Height as of 2/16/22: 188 cm (74\").    Weight as of 2/16/22: 97.1 kg (214 lb).          Physical Exam patient appears to be awake and alert.  He does not appear to be in acute respiratory distress.  His stated weight is 200 pounds.  His height 74 inches.  His body mass index 26.3.  Result Review :         Assessment and Plan   Diagnoses and all orders for this visit:    1. Snoring (Primary)  -     Home Sleep Study; Future    2. Obstructive sleep apnea, adult  -     Home Sleep Study; Future    3. Overweight    Patient has a history of snoring and nonrestorative sleep.  He gives a very good story for obstructive sleep apnea.  He has a positive family history.  We will plan to proceed to home sleep testing.  We have discussed potential therapies including CPAP, weight control, oral appliance, and surgery.  We have discussed the consequences of long-term untreated obstructive sleep apnea.  These include hypertension, diabetes, heart disease, stroke, and dementia.  He is encouraged to achieve ideal body weight.  He is encouraged to avoid alcohol and sedatives close to bedtime.  He is encouraged to practice lateral position sleep.  We will plan to see him back after his study.       I spent 25 minutes caring for Jay on this date of service. This time includes time spent by me in the following activities:obtaining and/or reviewing a separately obtained history, performing a medically appropriate examination and/or evaluation , counseling and educating the patient/family/caregiver, ordering medications, tests, or procedures and documenting information in the medical record  Follow Up   Return for Follow up after study, Next scheduled follow-up, " Video visit.  Patient was given instructions and counseling regarding his condition or for health maintenance advice. Please see specific information pulled into the AVS if appropriate.   Wilner Beltran MD [facility]  Sleep Medicine  Pulmonary and Critical Care Medicine

## 2022-08-01 ENCOUNTER — PATIENT MESSAGE (OUTPATIENT)
Dept: CARDIOLOGY | Facility: CLINIC | Age: 57
End: 2022-08-01

## 2022-08-01 ENCOUNTER — TELEPHONE (OUTPATIENT)
Dept: CARDIOLOGY | Facility: CLINIC | Age: 57
End: 2022-08-01

## 2022-08-01 DIAGNOSIS — I48.0 PAROXYSMAL ATRIAL FIBRILLATION: Primary | ICD-10-CM

## 2022-08-01 NOTE — TELEPHONE ENCOUNTER
"----- Message from Jay Nash Jr. sent at 8/1/2022  8:29 AM EDT -----  Regarding: Side Effects and Dosage Change  Dr. Polanco,    My next appt is in Jan, but I wanted to let you know that I think I am experiencing side effects to taking the current dose of Flecainide(2x daily).  I am experiencing vertigo like symptoms when walking down the aisle for communion at [employer].  This has happened many times.  I've almost passed out when stopping at an intersection while cycling.  My pulse when walking into my primary's office and sitting down was 43, which seems low even for me.    I am dropping down to Flecainide 1x per day.  Is this ok?    I've ignored the comments from friends about the side effects of this \"nasty\" drug for years...but I'm beginning to wonder.    Please let me know if the dosage change is ok.    Thank you,  Josemanuel Nash  "

## 2022-08-25 ENCOUNTER — TELEPHONE (OUTPATIENT)
Dept: CARDIOLOGY | Facility: CLINIC | Age: 57
End: 2022-08-25

## 2022-08-25 RX ORDER — FLECAINIDE ACETATE 50 MG/1
50 TABLET ORAL 2 TIMES DAILY
Qty: 60 TABLET | Refills: 6
Start: 2022-08-25 | End: 2022-08-26 | Stop reason: SDUPTHER

## 2022-08-25 NOTE — TELEPHONE ENCOUNTER
----- Message from Jay Nash Jr. sent at 2022 11:08 AM EDT -----  Regardin week heart monitor results?  Dr. Polanco,      I'm just curious how the results look.  I don't think I was in afib once.  Am I right?    Thank you,  Josemanuel Nash

## 2022-08-26 RX ORDER — FLECAINIDE ACETATE 50 MG/1
50 TABLET ORAL 2 TIMES DAILY
Qty: 60 TABLET | Refills: 6 | Status: SHIPPED | OUTPATIENT
Start: 2022-08-26 | End: 2022-08-26 | Stop reason: SDUPTHER

## 2022-08-26 RX ORDER — FLECAINIDE ACETATE 50 MG/1
50 TABLET ORAL 2 TIMES DAILY
Qty: 180 TABLET | Refills: 3 | Status: SHIPPED | OUTPATIENT
Start: 2022-08-26

## 2023-01-11 ENCOUNTER — OFFICE VISIT (OUTPATIENT)
Dept: CARDIOLOGY | Facility: CLINIC | Age: 58
End: 2023-01-11
Payer: COMMERCIAL

## 2023-01-11 VITALS
DIASTOLIC BLOOD PRESSURE: 68 MMHG | HEART RATE: 56 BPM | OXYGEN SATURATION: 96 % | BODY MASS INDEX: 27.08 KG/M2 | HEIGHT: 74 IN | WEIGHT: 211 LBS | SYSTOLIC BLOOD PRESSURE: 120 MMHG

## 2023-01-11 DIAGNOSIS — I48.0 PAROXYSMAL ATRIAL FIBRILLATION: Primary | ICD-10-CM

## 2023-01-11 DIAGNOSIS — I49.3 PVC (PREMATURE VENTRICULAR CONTRACTION): ICD-10-CM

## 2023-01-11 PROCEDURE — 93000 ELECTROCARDIOGRAM COMPLETE: CPT | Performed by: INTERNAL MEDICINE

## 2023-01-11 PROCEDURE — 99213 OFFICE O/P EST LOW 20 MIN: CPT | Performed by: INTERNAL MEDICINE

## 2023-01-11 NOTE — PROGRESS NOTES
Jay Nash .  1965  [phone]    01/11/2023    [facility] CARDIOLOGY [facility]     Referring Provider: No ref. provider found     Augusto Chaney MD  [id number] [address]    CC:    Problem List:   1. Paroxysmal atrial fibrillation  a. CHADSVASc = 0, on ASA  b. 24-hour Holter monitor 1/30/2018: Predominantly normal sinus rhythm, average heart rate 58 bpm ( bpm)   c. 2 weeks Zio patch 7/5/2018 - 3% AF burden, average HR 53 bpm ( bpm), 5 beat run of NSVT  d. GXT 09/2018 - reportedly negative for ischemia - data deficit  e. Initiated on pill-in-pocket Flecainide  f. Echocardiogram 7/30/18: EF 60%, no significant valvular disease  g. 12 day holter monitor 9/2022: NSR, no afib  2. PVC/NSVT  a. Noted on Zio patch, 07/2018, 5 beat run of NSVT  3. BPH  4. Tubular adenoma of rectum  5. Surgical history  a. Oral surgery     Allergies  No Known Allergies    Current Medications    Current Outpatient Medications:   •  aspirin 81 MG EC tablet, Take 81 mg by mouth Daily., Disp: , Rfl:   •  doxazosin (CARDURA) 4 MG tablet, Take 4 mg by mouth Every Night., Disp: , Rfl:   •  flecainide (TAMBOCOR) 50 MG tablet, Take 1 tablet by mouth 2 (Two) Times a Day., Disp: 180 tablet, Rfl: 3  •  Multiple Vitamins-Minerals (MULTIVITAMIN ADULT PO), Take  by mouth Daily., Disp: , Rfl:     History of Present Illness     Pt presents for follow up of AF, PVCs. Since we last saw the pt, pt denies any palpitations, SOB, CP. He did have one episode of dizziness in September 2022, decreased his dose of flecainide and has not had any recurrent dizziness. He wore a 12 day holter monitor at that time that showed NSR, 1 short run of SVT, no afib. He did go to the ER for a laceration to the thumb. Denies any hospitalizations, bleeding, or TIA/CVA symptoms.  BP is well controlled, does not check at home. Overall feels well.    ROS:  General:  Denies fatigue, weight gain or  "loss  Cardiovascular:  Denies CP, PND, syncope, near syncope, edema or palpitations.  Pulmonary:  Denies VILLALBA, cough, or wheezing      Vitals:    01/11/23 1553   BP: 120/68   BP Location: Left arm   Patient Position: Sitting   Pulse: 56   SpO2: 96%   Weight: 95.7 kg (211 lb)   Height: 188 cm (74.02\")     Body mass index is 27.08 kg/m².  PE:  General: NAD  Neck: no JVD, no carotid bruits, no TM  Heart RRR, NL S1, S2, S4 present, no rubs, murmurs  Lungs: CTA, no wheezes, rhonchi, or rales  Abd: soft, non-tender, NL BS  Ext: No musculoskeletal deformities, no edema, cyanosis, or clubbing  Psych: normal mood and affect    Diagnostic Data:        ECG 12 Lead    Date/Time: 1/11/2023 4:29 PM  Performed by: Helio Polanco MD  Authorized by: Helio Polanco MD   Comparison: compared with previous ECG from 2/16/2022  Similar to previous ECG  Rhythm: sinus bradycardia  Rate: bradycardic  BPM: 56              1. Paroxysmal atrial fibrillation (HCC)    2. PVC (premature ventricular contraction)        Plan:  1. PAF: Flecainide decreased to 50mg BID due to episode of dizziness. Wore a holter monitor that showed NSR, no afib. EKG stable. No episodes in the last year   2. PVC: stable, no symptoms   3. Chadsvasc=0, ASA daily. PRN Eliquis  4. 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      "

## 2023-10-10 RX ORDER — FLECAINIDE ACETATE 50 MG/1
50 TABLET ORAL 2 TIMES DAILY
Qty: 180 TABLET | Refills: 2 | Status: SHIPPED | OUTPATIENT
Start: 2023-10-10

## 2023-10-11 ENCOUNTER — OFFICE VISIT (OUTPATIENT)
Dept: CARDIOLOGY | Facility: CLINIC | Age: 58
End: 2023-10-11
Payer: COMMERCIAL

## 2023-10-11 VITALS
BODY MASS INDEX: 26.62 KG/M2 | HEART RATE: 49 BPM | HEIGHT: 74 IN | OXYGEN SATURATION: 98 % | WEIGHT: 207.4 LBS | SYSTOLIC BLOOD PRESSURE: 124 MMHG | DIASTOLIC BLOOD PRESSURE: 74 MMHG

## 2023-10-11 DIAGNOSIS — R00.1 BRADYCARDIA, SINUS: ICD-10-CM

## 2023-10-11 DIAGNOSIS — I49.3 PVC (PREMATURE VENTRICULAR CONTRACTION): ICD-10-CM

## 2023-10-11 DIAGNOSIS — I48.0 PAROXYSMAL ATRIAL FIBRILLATION: Primary | ICD-10-CM

## 2023-10-11 NOTE — PROGRESS NOTES
Jay Nash Jr.  1965  [phone]    10/11/2023    [facility] CARDIOLOGY     Referring Provider: No ref. provider found     Augusto Chaney MD  [address] [address]    Chief Complaint   Patient presents with    Paroxysmal atrial fibrillation         Problem List:   Paroxysmal atrial fibrillation  CHADSVASc = 0, on ASA  24-hour Holter monitor 1/30/2018: Predominantly normal sinus rhythm, average heart rate 58 bpm ( bpm)   2 weeks Zio patch 7/5/2018 - 3% AF burden, average HR 53 bpm ( bpm), 5 beat run of NSVT  GXT 09/2018 - reportedly negative for ischemia - data deficit  Initiated on pill-in-pocket Flecainide  Echocardiogram 7/30/18: EF 60%, no significant valvular disease  12 day holter monitor 9/2022: NSR, no afib  PVC/NSVT  Noted on Zio patch, 07/2018, 5 beat run of NSVT  BPH  Tubular adenoma of rectum  Surgical history  Oral surgery  Allergies  No Known Allergies    Current Medications    Current Outpatient Medications:     aspirin 81 MG EC tablet, Take 1 tablet by mouth Daily., Disp: , Rfl:     doxazosin (CARDURA) 4 MG tablet, Take 1 tablet by mouth Every Night., Disp: , Rfl:     flecainide (TAMBOCOR) 50 MG tablet, TAKE 1 TABLET TWICE A DAY, Disp: 180 tablet, Rfl: 2    History of Present Illness:      Pt presents for follow up of PAF, PVCs, bradycardia.  Since we last saw the pt, he thinks he had 2 episodes of atrial fibrillation a few weeks ago, a few days apart, lasting 10-12 hours. He thinks this felt like his AFIB before. Otherwise, he has been doing well. He denies SOB, CP, LH, and dizziness, syncope.  Denies any hospitalizations, ER visits, bleeding, or TIA/CVA symptoms. Overall feels well. BP not checked at home.     ROS:  General:  Denies fatigue, weight gain or loss  Cardiovascular:  Denies CP, PND, syncope, near syncope, edema + palpitations.  Pulmonary:  Denies VILLALBA, cough, or wheezing      Vitals:    10/11/23 1611   BP: 124/74   BP  "Location: Right arm   Patient Position: Sitting   Pulse: (!) 49   SpO2: 98%   Weight: 94.1 kg (207 lb 6.4 oz)   Height: 188 cm (74\")     Body mass index is 26.63 kg/mý.  PE:  General: NAD  Neck: no JVD, no carotid bruits, no TM  Heart RRR, NL S1, S2, S4 present, no rubs, murmurs  Lungs: CTA, no wheezes, rhonchi, or rales  Abd: soft, non-tender, NL BS  Ext: No musculoskeletal deformities, no edema, cyanosis, or clubbing  Psych: normal mood and affect    Diagnostic Data:        ECG 12 Lead    Date/Time: 10/11/2023 4:39 PM  Performed by: Carmelina Zimmer PA    Authorized by: Carmelina Zimmer PA  Comparison: compared with previous ECG from 1/11/2023  Similar to previous ECG  Rhythm: sinus bradycardia  BPM: 49               1. Paroxysmal atrial fibrillation    2. PVC (premature ventricular contraction)    3. Bradycardia, sinus          Plan:  1. PAF:   - on Flecainide 50 mg BID. 2 episodes in the last month, 10-12 hours. Monitor for now. Educated patient that he can take an extra flecainide as needed for episodes and also take an Eliquis.   2. PVC: stable, no symptoms   3. Chadsvasc=0, ASA daily. PRN Eliquis  4. 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     F/up in 6-9  months    Electronically signed by MICHAEL Abel, 10/11/23, 4:30 PM EDT.    "

## 2023-10-27 ENCOUNTER — TELEPHONE (OUTPATIENT)
Dept: CARDIOLOGY | Facility: CLINIC | Age: 58
End: 2023-10-27
Payer: COMMERCIAL

## 2023-10-27 DIAGNOSIS — I48.0 PAROXYSMAL ATRIAL FIBRILLATION: Primary | ICD-10-CM

## 2023-10-27 NOTE — TELEPHONE ENCOUNTER
Patient notified and voices understanding.     Patient also request samples of eliquis. I notified patient he can pick eliquis up when he comes in for his EKG.

## 2023-10-27 NOTE — TELEPHONE ENCOUNTER
Patient called and states his PCP prescribes him Doxazosin 4mg nightly. He states last night he took an extra Doxazosin. He states in the middle of the night he got up and passed out. The patient states he still feels dizzy this morning and can barely stand. He states his BP this morning was 80/50 . His BP is currently 98/65. The patient states he feels he is also in afib.   Patient states he did take an eliquis this morning since he feels he is in afib. The patient states when he does stand up and walk his HR increases to 130 bpm. The patient currently takes flecainide 50 mg BID. He states he usually takes an extra flecainide for afib but because of his BP he has not done that.     I advised patient to go to be evaluated at the closest ED due to passing out and his blood pressure. Patient states he would like to just stay home and drink fluids to try to get the medication out of his system.     I notified patient if symptoms worsen to go to the closest ED. Patient voices understanding.

## 2023-10-31 ENCOUNTER — CLINICAL SUPPORT (OUTPATIENT)
Dept: CARDIOLOGY | Facility: CLINIC | Age: 58
End: 2023-10-31
Payer: COMMERCIAL

## 2023-10-31 DIAGNOSIS — R00.1 BRADYCARDIA, SINUS: Primary | ICD-10-CM

## 2023-10-31 PROCEDURE — 93000 ELECTROCARDIOGRAM COMPLETE: CPT | Performed by: INTERNAL MEDICINE

## 2024-06-26 ENCOUNTER — OFFICE VISIT (OUTPATIENT)
Dept: CARDIOLOGY | Facility: CLINIC | Age: 59
End: 2024-06-26
Payer: COMMERCIAL

## 2024-06-26 VITALS
DIASTOLIC BLOOD PRESSURE: 74 MMHG | HEIGHT: 73 IN | WEIGHT: 204.4 LBS | SYSTOLIC BLOOD PRESSURE: 118 MMHG | HEART RATE: 61 BPM | OXYGEN SATURATION: 95 % | BODY MASS INDEX: 27.09 KG/M2

## 2024-06-26 DIAGNOSIS — I49.3 PVC (PREMATURE VENTRICULAR CONTRACTION): ICD-10-CM

## 2024-06-26 DIAGNOSIS — I48.0 PAROXYSMAL ATRIAL FIBRILLATION: Primary | ICD-10-CM

## 2024-06-26 PROBLEM — R00.1 BRADYCARDIA, SINUS: Status: ACTIVE | Noted: 2024-06-26

## 2024-06-26 PROCEDURE — 93000 ELECTROCARDIOGRAM COMPLETE: CPT | Performed by: INTERNAL MEDICINE

## 2024-06-26 PROCEDURE — 99214 OFFICE O/P EST MOD 30 MIN: CPT | Performed by: INTERNAL MEDICINE

## 2024-06-26 NOTE — PROGRESS NOTES
Jay Nash .  1965  [phone]    06/26/2024    [facility] CARDIOLOGY     Referring Provider: No ref. provider found     Augusto Chaney MD  [address] [address]    Chief Complaint   Patient presents with    Paroxysmal atrial fibrillation       Problem List:   Paroxysmal atrial fibrillation  CHADSVASc = 0, on ASA  24-hour Holter monitor 1/30/2018: Predominantly normal sinus rhythm, average heart rate 58 bpm ( bpm)   2 weeks Zio patch 7/5/2018 - 3% AF burden, average HR 53 bpm ( bpm), 5 beat run of NSVT  GXT 09/2018 - reportedly negative for ischemia - data deficit  Initiated on pill-in-pocket Flecainide  Echocardiogram 7/30/18: EF 60%, no significant valvular disease  12 day holter monitor 9/2022: NSR, no afib  PVC/NSVT  Noted on Zio patch, 07/2018, 5 beat run of NSVT  BPH  Tubular adenoma of rectum  Surgical history  Oral surgery    Allergies  No Known Allergies    Current Medications    Current Outpatient Medications:     aspirin 81 MG EC tablet, Take 1 tablet by mouth Daily., Disp: , Rfl:     flecainide (TAMBOCOR) 50 MG tablet, TAKE 1 TABLET TWICE A DAY (Patient taking differently: Take 3 tablets by mouth 2 (Two) Times a Day. Two 50mg tablets in the Morning and One 50mg tablet at night), Disp: 180 tablet, Rfl: 2    History of Present Illness     Pt presents for follow up of AF/PVC. Since we last saw the pt, pt states that he has had 2 episodes of atrial fibrillation since her last saw him.  Initially they he lowered his flecainide to 50 mg p.o. twice daily as he was having side effects to the medication.  Then he had these episodes of A-fib.  Now he is taking total of 150 mg of flecainide a day.  Has done better since then.  His biggest concern is that he has had recurrent prostatitis that he feels might be related to flecainide.  Blood pressure and heart rates have been stable.  No other cardiovascular complaints at this time.       Vitals:  "   06/26/24 0912   BP: 118/74   BP Location: Left arm   Patient Position: Sitting   Pulse: 61   SpO2: 95%   Weight: 92.7 kg (204 lb 6.4 oz)   Height: 185.4 cm (73\")     Body mass index is 26.97 kg/m².  PE:  General: NAD  Neck: no JVD, no carotid bruits, no TM  Heart RRR, NL S1, S2, S4 present, no rubs, murmurs  Lungs: CTA, no wheezes, rhonchi, or rales  Abd: soft, non-tender, NL BS  Ext: No musculoskeletal deformities, no edema, cyanosis, or clubbing  Psych: normal mood and affect    Diagnostic Data:        ECG 12 Lead    Date/Time: 6/26/2024 9:25 AM  Performed by: Helio Polanco MD    Authorized by: Helio Polanco MD  Comparison: compared with previous ECG from 10/31/2023  Similar to previous ECG  Rhythm: sinus bradycardia  BPM: 56               1. Paroxysmal atrial fibrillation    2. PVC (premature ventricular contraction)          Plan:    1. PAF:   - on Flecainide total of 150 mg daily.  Two episodes in the last year on lower dose of flecainide. Pt also concerned about possible flecainide induced prostatitis. Discussed PFA with pt who is interested in the procedure.  Patient is to initiate Eliquis 5 mg p.o. twice daily 2 weeks prior to the pulmonary vein ablation.  He is to stop flecainide 5 days prior as well.  I discussed all the risk and benefits with him in detail who understands and wishes to proceed.  2. PVC: stable, no symptoms   3. Chadsvasc=0, ASA daily. PRN Eliquis  4. Bradycardia- asymptomatic    F/up in 6 months      "

## 2024-06-28 ENCOUNTER — TELEPHONE (OUTPATIENT)
Dept: CARDIOLOGY | Facility: CLINIC | Age: 59
End: 2024-06-28
Payer: COMMERCIAL

## 2024-06-28 NOTE — TELEPHONE ENCOUNTER
Spoke with patient regarding mail order eliquis patient is set to receive on Monday. Told pt to hold onto eliquis and start two weeks prior to ablation per Dr. Polanco. Scheduling will call him with a date for ablation. Pt voiced understanding.

## 2024-07-01 NOTE — NURSING NOTE
PRE-PVA ASSESSMENT  Jay Nash Jr. 1965   [address] [place] [address]   [phone] (work)      Referral Source: Clinic.  Augusto Chaney- PCP  Information obtained from: [x] Medical record review  [x] Patient report  Scheduled for: PFA on 7/29/24 with Dr. Polanco  No Known Allergies    AFib Specific History:  AFIBTYPE: paroxysmal    CHADS-VASc Risk Assessment               0 Total Score    Criteria that do not apply:    CHF    Hypertension    Age >/= 75    DM    PRIOR STROKE/TIA/THROMBO    Vascular Disease    Age 65-74    Sex: Female            Anticoagulation: ASA. Eliquis 5mg every 12h to start 2 weeks prior to procedure.   Cardioversion x 0  Failed AAD(s): Flecainide  Prior Ablation: No    Is Mr. Nash aware of his AFib? Yes   Onset: 3-4y ago   Exacerbations: yes   Frequency: Every few months Alleviations: flecainide    Duration: 9-10h     Symptoms:   [x] Palpitations:    [] Chest Discomfort:    [] Dizziness:    [] Presyncope:    [] Lightheadedness:   [] Syncope:    [] Fatigue:    [] Other:     [] Short of Breath:     Last Echo(s):  [x] TTE Date: 7/30/18                 EF: 60%            LA: 3.7cm            VHD: No significant valvular disease        Past medical History:   [] Diabetes: No                   [] HYPOthyroidism  [] HYPERthyroidism  [] HTN   [] Heart Failure    [] CVA                               [] TIA    [] CAD         [] MI            [] Dyslipidemia  [x] Ischemic Evaluation       [x] Stress Test: GXT 09/2018 - reportedly negative for ischemia        [] Heart Cath:   [] Sleep Apnea Diagnosed  [] Sleep Apnea Suspected   [] Obesity: No  [] Depression   [] Anxiety                  [x] Urologic History- BPH       [] Urologic cancer surgery         [] Severe decrease in flow of urine stream        [] Recent unexplained gross hematuria       [] Hx urethral stricture     Other Pertinent PMH: Tubular adenoma of rectum     Social / Lifestyle History:   []   Tobacco:    [x]    Alcohol:                     [] Former:          [x] Current: 2 drinks per week(s)   []   Caffeine: No   []   Recreational Drugs: No   []   Stress Issues: No   []   Exercise: bicycling 2-3x week    Summary of Patient Contact:    Discussed upcoming PFA with patient, as well as postprocedure expectations.  Discussed upcoming preprocedure testing, and medication instructions.                 Discharged

## 2024-07-02 ENCOUNTER — TELEPHONE (OUTPATIENT)
Dept: CARDIOLOGY | Facility: CLINIC | Age: 59
End: 2024-07-02
Payer: COMMERCIAL

## 2024-07-02 NOTE — TELEPHONE ENCOUNTER
"    Name: Jay Nash Jr. \"Josemanuel\"    Relationship: Self    Best Callback Number: [phone]    Incoming call to the Hub, requesting to  Reschedule their Device Check appointment on 10.30.24  AND ALSO  01.29.24.       PATIENT IS A  AND NEEDS ALL HIS APPOINTMENTS SCHEDULED FOR AFTER 3:30    Per Hub workflow, further review is needed before the task can be completed.    Result of Call: Please reach out to the patient to reschedule    "

## 2024-07-08 ENCOUNTER — PREP FOR SURGERY (OUTPATIENT)
Dept: OTHER | Facility: HOSPITAL | Age: 59
End: 2024-07-08
Payer: COMMERCIAL

## 2024-07-08 DIAGNOSIS — I48.0 PAROXYSMAL ATRIAL FIBRILLATION: Primary | ICD-10-CM

## 2024-07-08 RX ORDER — ACETAMINOPHEN 325 MG/1
650 TABLET ORAL EVERY 4 HOURS PRN
OUTPATIENT
Start: 2024-07-08

## 2024-07-08 RX ORDER — NITROGLYCERIN 0.4 MG/1
0.4 TABLET SUBLINGUAL
OUTPATIENT
Start: 2024-07-08

## 2024-07-08 RX ORDER — SODIUM CHLORIDE 9 MG/ML
1 INJECTION, SOLUTION INTRAVENOUS CONTINUOUS
OUTPATIENT
Start: 2024-07-08 | End: 2024-07-08

## 2024-07-08 RX ORDER — SODIUM CHLORIDE 9 MG/ML
40 INJECTION, SOLUTION INTRAVENOUS AS NEEDED
OUTPATIENT
Start: 2024-07-08

## 2024-07-08 RX ORDER — SODIUM CHLORIDE 0.9 % (FLUSH) 0.9 %
10 SYRINGE (ML) INJECTION AS NEEDED
OUTPATIENT
Start: 2024-07-08

## 2024-07-08 RX ORDER — SODIUM CHLORIDE 0.9 % (FLUSH) 0.9 %
3 SYRINGE (ML) INJECTION EVERY 12 HOURS SCHEDULED
OUTPATIENT
Start: 2024-07-08

## 2024-07-22 ENCOUNTER — PRE-ADMISSION TESTING (OUTPATIENT)
Dept: PREADMISSION TESTING | Facility: HOSPITAL | Age: 59
End: 2024-07-22
Payer: COMMERCIAL

## 2024-07-22 ENCOUNTER — HOSPITAL ENCOUNTER (OUTPATIENT)
Dept: CT IMAGING | Facility: HOSPITAL | Age: 59
Discharge: HOME | End: 2024-07-22
Payer: COMMERCIAL

## 2024-07-22 DIAGNOSIS — I48.0 PAROXYSMAL ATRIAL FIBRILLATION: ICD-10-CM

## 2024-07-22 LAB
ANION GAP SERPL CALCULATED.3IONS-SCNC: 7 MMOL/L (ref 5–15)
BUN SERPL-MCNC: 22 MG/DL (ref 6–20)
BUN/CREAT SERPL: 20 (ref 7–25)
CALCIUM SPEC-SCNC: 9.4 MG/DL (ref 8.6–10.5)
CHLORIDE SERPL-SCNC: 107 MMOL/L (ref 98–107)
CO2 SERPL-SCNC: 28 MMOL/L (ref 22–29)
CREAT SERPL-MCNC: 1.1 MG/DL (ref 0.76–1.27)
DEPRECATED RDW RBC AUTO: 40.7 FL (ref 37–54)
EGFRCR SERPLBLD CKD-EPI 2021: 77.3 ML/MIN/1.73
ERYTHROCYTE [DISTWIDTH] IN BLOOD BY AUTOMATED COUNT: 12 % (ref 12.3–15.4)
GLUCOSE SERPL-MCNC: 96 MG/DL (ref 65–99)
HBA1C MFR BLD: 5.1 % (ref 4.8–5.6)
HCT VFR BLD AUTO: 46.4 % (ref 37.5–51)
HGB BLD-MCNC: 15.3 G/DL (ref 13–17.7)
INR PPP: 1.06 (ref 0.89–1.12)
MCH RBC QN AUTO: 30.2 PG (ref 26.6–33)
MCHC RBC AUTO-ENTMCNC: 33 G/DL (ref 31.5–35.7)
MCV RBC AUTO: 91.7 FL (ref 79–97)
PLATELET # BLD AUTO: 251 10*3/MM3 (ref 140–450)
PMV BLD AUTO: 9.8 FL (ref 6–12)
POTASSIUM SERPL-SCNC: 4.9 MMOL/L (ref 3.5–5.2)
PROTHROMBIN TIME: 13.9 SECONDS (ref 12.2–14.5)
RBC # BLD AUTO: 5.06 10*6/MM3 (ref 4.14–5.8)
SODIUM SERPL-SCNC: 142 MMOL/L (ref 136–145)
WBC NRBC COR # BLD AUTO: 5.27 10*3/MM3 (ref 3.4–10.8)

## 2024-07-22 PROCEDURE — 71275 CT ANGIOGRAPHY CHEST: CPT

## 2024-07-22 PROCEDURE — 85610 PROTHROMBIN TIME: CPT

## 2024-07-22 PROCEDURE — 80048 BASIC METABOLIC PNL TOTAL CA: CPT

## 2024-07-22 PROCEDURE — 85027 COMPLETE CBC AUTOMATED: CPT

## 2024-07-22 PROCEDURE — 25510000001 IOPAMIDOL PER 1 ML: Performed by: INTERNAL MEDICINE

## 2024-07-22 PROCEDURE — 83036 HEMOGLOBIN GLYCOSYLATED A1C: CPT | Performed by: PHYSICIAN ASSISTANT

## 2024-07-22 PROCEDURE — 36415 COLL VENOUS BLD VENIPUNCTURE: CPT

## 2024-07-22 RX ADMIN — IOPAMIDOL 90 ML: 755 INJECTION, SOLUTION INTRAVENOUS at 13:53

## 2024-07-22 NOTE — PAT
Patient did not review general PAT education video as instructed in their preoperative information received from their surgeon.  One-on-one Pre Admission Testing general education provided during PAT visit.  Copies of PAT general education handouts (Incentive Spirometry, Meds to Beds Program, Patient Belongings, Pre-op skin preparation instructions, Blood Glucose testing, Visitor policy, Surgery FAQ, Code H) distributed to patient. Encouraged patient/family to read PAT general education handouts thoroughly and notify PAT staff with any questions or concerns. Patient instructed to bring PAT pass and completed skin prep sheet (if applicable) on the day of procedure. Patient verbalized understanding of all information and priority content.     An arrival time for procedure was not provided during PAT visit. If patient had any questions or concerns about their arrival time, they were instructed to contact their surgeon/physician.  Additionally, if the patient referred to an arrival time that was acquired from their my chart account, patient was encouraged to verify that time with their surgeon/physician. Arrival times are NOT provided in Pre Admission Testing Department.    Pt aware of CT at [facility] location after PAT.     Pt aware of holding Flecainide 5 days prior and Eliquis morning of procedure.

## 2024-07-23 ENCOUNTER — PATIENT MESSAGE (OUTPATIENT)
Dept: CARDIOLOGY | Facility: CLINIC | Age: 59
End: 2024-07-23
Payer: COMMERCIAL

## 2024-07-23 DIAGNOSIS — I48.0 PAROXYSMAL ATRIAL FIBRILLATION: Primary | ICD-10-CM

## 2024-07-23 NOTE — TELEPHONE ENCOUNTER
Labwork shows possible mild dehydration (with mildly elevated BUN), would hydrate more with water over the next week until ablation. All other labwork is acceptable. CT scan acceptable.   We can order an echocardiogram to look at his EF since he has not had one since 2018. That is a better test than CT scan Chest to further evaluate his heart.  Could this get done prior to his ablation?

## 2024-07-24 ENCOUNTER — HOSPITAL ENCOUNTER (OUTPATIENT)
Dept: CARDIOLOGY | Facility: HOSPITAL | Age: 59
Discharge: HOME | End: 2024-07-24
Admitting: PHYSICIAN ASSISTANT
Payer: COMMERCIAL

## 2024-07-24 VITALS — HEIGHT: 73 IN | BODY MASS INDEX: 26.88 KG/M2 | WEIGHT: 202.82 LBS

## 2024-07-24 DIAGNOSIS — I48.0 PAROXYSMAL ATRIAL FIBRILLATION: ICD-10-CM

## 2024-07-24 LAB
ASCENDING AORTA: 3.1 CM
BH CV ECHO MEAS - AO MAX PG: 4 MMHG
BH CV ECHO MEAS - AO MEAN PG: 3 MMHG
BH CV ECHO MEAS - AO ROOT DIAM: 3.7 CM
BH CV ECHO MEAS - AO V2 MAX: 100.3 CM/SEC
BH CV ECHO MEAS - AO V2 VTI: 22.9 CM
BH CV ECHO MEAS - AVA(I,D): 2.7 CM2
BH CV ECHO MEAS - EF(MOD-BP): 59 %
BH CV ECHO MEAS - EF(MOD-SP2): 63 %
BH CV ECHO MEAS - EF(MOD-SP4): 53 %
BH CV ECHO MEAS - IVS/LVPW: 1.09 CM
BH CV ECHO MEAS - IVSD: 1.2 CM
BH CV ECHO MEAS - LA DIMENSION: 3.8 CM
BH CV ECHO MEAS - LAT PEAK E' VEL: 13 CM/SEC
BH CV ECHO MEAS - LV MAX PG: 2.9 MMHG
BH CV ECHO MEAS - LV MEAN PG: 1.3 MMHG
BH CV ECHO MEAS - LV V1 MAX: 84.1 CM/SEC
BH CV ECHO MEAS - LV V1 VTI: 19.2 CM
BH CV ECHO MEAS - LVIDD: 5.1 CM
BH CV ECHO MEAS - LVIDS: 3.7 CM
BH CV ECHO MEAS - LVOT DIAM: 2 CM
BH CV ECHO MEAS - LVPWD: 1.1 CM
BH CV ECHO MEAS - MED PEAK E' VEL: 8 CM/SEC
BH CV ECHO MEAS - MV A MAX VEL: 37.6 CM/SEC
BH CV ECHO MEAS - MV E MAX VEL: 49.4 CM/SEC
BH CV ECHO MEAS - MV E/A: 1.31
BH CV ECHO MEAS - MV MAX PG: 1.82 MMHG
BH CV ECHO MEAS - MV MEAN PG: 0.5 MMHG
BH CV ECHO MEAS - MV P1/2T: 70 MSEC
BH CV ECHO MEAS - MV V2 VTI: 30.1 CM
BH CV ECHO MEAS - MVA(P1/2T): 3.2 CM2
BH CV ECHO MEAS - PA ACC TIME: 0.12 SEC
BH CV ECHO MEAS - PA V2 MAX: 72.1 CM/SEC
BH CV ECHO MEAS - TAPSE (>1.6): 3.5 CM
BH CV ECHO MEAS - TR MAX PG: 20.3 MMHG
BH CV ECHO MEAS - TR MAX VEL: 225.1 CM/SEC
BH CV ECHO MEASUREMENTS AVERAGE E/E' RATIO: 4.7
BH CV VAS BP RIGHT ARM: NORMAL MMHG
BH CV XLRA - RV BASE: 5.4 CM
BH CV XLRA - RV LENGTH: 9 CM
BH CV XLRA - RV MID: 3.7 CM
BH CV XLRA - TDI S': 16 CM/SEC
IVRT: 94 MS
LEFT ATRIUM VOLUME INDEX: 28 ML/M2

## 2024-07-24 PROCEDURE — 93306 TTE W/DOPPLER COMPLETE: CPT

## 2024-07-29 ENCOUNTER — HOSPITAL ENCOUNTER (OUTPATIENT)
Facility: HOSPITAL | Age: 59
Discharge: HOME | End: 2024-07-29
Attending: INTERNAL MEDICINE | Admitting: INTERNAL MEDICINE
Payer: COMMERCIAL

## 2024-07-29 ENCOUNTER — ANESTHESIA EVENT (OUTPATIENT)
Dept: CARDIOLOGY | Facility: HOSPITAL | Age: 59
End: 2024-07-29
Payer: COMMERCIAL

## 2024-07-29 ENCOUNTER — ANESTHESIA (OUTPATIENT)
Dept: CARDIOLOGY | Facility: HOSPITAL | Age: 59
End: 2024-07-29
Payer: COMMERCIAL

## 2024-07-29 VITALS
SYSTOLIC BLOOD PRESSURE: 108 MMHG | BODY MASS INDEX: 25.36 KG/M2 | OXYGEN SATURATION: 95 % | HEART RATE: 51 BPM | TEMPERATURE: 97.7 F | RESPIRATION RATE: 19 BRPM | DIASTOLIC BLOOD PRESSURE: 70 MMHG | WEIGHT: 197.6 LBS | HEIGHT: 74 IN

## 2024-07-29 DIAGNOSIS — I48.0 PAROXYSMAL ATRIAL FIBRILLATION: ICD-10-CM

## 2024-07-29 PROBLEM — I48.91 ATRIAL FIBRILLATION: Status: ACTIVE | Noted: 2024-07-29

## 2024-07-29 PROCEDURE — C1733 CATH, EP, OTHR THAN COOL-TIP: HCPCS | Performed by: INTERNAL MEDICINE

## 2024-07-29 PROCEDURE — 93657 TX L/R ATRIAL FIB ADDL: CPT | Performed by: INTERNAL MEDICINE

## 2024-07-29 PROCEDURE — 93623 PRGRMD STIMJ&PACG IV RX NFS: CPT | Performed by: INTERNAL MEDICINE

## 2024-07-29 PROCEDURE — C1766 INTRO/SHEATH,STRBLE,NON-PEEL: HCPCS | Performed by: INTERNAL MEDICINE

## 2024-07-29 PROCEDURE — C1769 GUIDE WIRE: HCPCS | Performed by: INTERNAL MEDICINE

## 2024-07-29 PROCEDURE — 25810000003 SODIUM CHLORIDE 0.9 % SOLUTION: Performed by: PHYSICIAN ASSISTANT

## 2024-07-29 PROCEDURE — 25010000002 MIDAZOLAM PER 1 MG: Performed by: NURSE ANESTHETIST, CERTIFIED REGISTERED

## 2024-07-29 PROCEDURE — 25010000002 GLYCOPYRROLATE 1 MG/5ML SOLUTION: Performed by: NURSE ANESTHETIST, CERTIFIED REGISTERED

## 2024-07-29 PROCEDURE — C1894 INTRO/SHEATH, NON-LASER: HCPCS | Performed by: INTERNAL MEDICINE

## 2024-07-29 PROCEDURE — 25010000002 BUPIVACAINE 0.5 % SOLUTION: Performed by: INTERNAL MEDICINE

## 2024-07-29 PROCEDURE — C1893 INTRO/SHEATH, FIXED,NON-PEEL: HCPCS | Performed by: INTERNAL MEDICINE

## 2024-07-29 PROCEDURE — C1760 CLOSURE DEV, VASC: HCPCS | Performed by: INTERNAL MEDICINE

## 2024-07-29 PROCEDURE — 25010000002 LIDOCAINE 1 % SOLUTION: Performed by: INTERNAL MEDICINE

## 2024-07-29 PROCEDURE — 25010000002 HEPARIN (PORCINE) PER 1000 UNITS: Performed by: INTERNAL MEDICINE

## 2024-07-29 PROCEDURE — 85347 COAGULATION TIME ACTIVATED: CPT

## 2024-07-29 PROCEDURE — 25010000002 KETOROLAC TROMETHAMINE PER 15 MG: Performed by: INTERNAL MEDICINE

## 2024-07-29 PROCEDURE — C1732 CATH, EP, DIAG/ABL, 3D/VECT: HCPCS | Performed by: INTERNAL MEDICINE

## 2024-07-29 PROCEDURE — C1730 CATH, EP, 19 OR FEW ELECT: HCPCS | Performed by: INTERNAL MEDICINE

## 2024-07-29 PROCEDURE — 25010000002 DEXAMETHASONE PER 1 MG: Performed by: NURSE ANESTHETIST, CERTIFIED REGISTERED

## 2024-07-29 PROCEDURE — 93656 COMPRE EP EVAL ABLTJ ATR FIB: CPT | Performed by: INTERNAL MEDICINE

## 2024-07-29 PROCEDURE — 25010000002 SUGAMMADEX 200 MG/2ML SOLUTION: Performed by: NURSE ANESTHETIST, CERTIFIED REGISTERED

## 2024-07-29 PROCEDURE — 25010000002 PROPOFOL 10 MG/ML EMULSION: Performed by: NURSE ANESTHETIST, CERTIFIED REGISTERED

## 2024-07-29 PROCEDURE — 36415 COLL VENOUS BLD VENIPUNCTURE: CPT

## 2024-07-29 PROCEDURE — C1759 CATH, INTRA ECHOCARDIOGRAPHY: HCPCS | Performed by: INTERNAL MEDICINE

## 2024-07-29 PROCEDURE — 25010000002 PROTAMINE SULFATE PER 10 MG: Performed by: INTERNAL MEDICINE

## 2024-07-29 PROCEDURE — 25010000002 FENTANYL CITRATE (PF) 50 MCG/ML SOLUTION: Performed by: NURSE ANESTHETIST, CERTIFIED REGISTERED

## 2024-07-29 PROCEDURE — 25010000002 ONDANSETRON PER 1 MG: Performed by: NURSE ANESTHETIST, CERTIFIED REGISTERED

## 2024-07-29 PROCEDURE — 25010000002 PHENYLEPHRINE 10 MG/ML SOLUTION 1 ML VIAL: Performed by: NURSE ANESTHETIST, CERTIFIED REGISTERED

## 2024-07-29 PROCEDURE — 93655 ICAR CATH ABLTJ DSCRT ARRHYT: CPT | Performed by: INTERNAL MEDICINE

## 2024-07-29 PROCEDURE — 25810000003 SODIUM CHLORIDE 0.9 % SOLUTION: Performed by: INTERNAL MEDICINE

## 2024-07-29 RX ORDER — SODIUM CHLORIDE 9 MG/ML
1 INJECTION, SOLUTION INTRAVENOUS CONTINUOUS
Status: ACTIVE | OUTPATIENT
Start: 2024-07-29 | End: 2024-07-29

## 2024-07-29 RX ORDER — ACETAMINOPHEN 325 MG/1
650 TABLET ORAL EVERY 4 HOURS PRN
Status: DISCONTINUED | OUTPATIENT
Start: 2024-07-29 | End: 2024-07-29 | Stop reason: HOSPADM

## 2024-07-29 RX ORDER — PANTOPRAZOLE SODIUM 40 MG/1
40 TABLET, DELAYED RELEASE ORAL DAILY
Qty: 30 TABLET | Refills: 0 | Status: SHIPPED | OUTPATIENT
Start: 2024-07-29

## 2024-07-29 RX ORDER — ONDANSETRON 2 MG/ML
INJECTION INTRAMUSCULAR; INTRAVENOUS AS NEEDED
Status: DISCONTINUED | OUTPATIENT
Start: 2024-07-29 | End: 2024-07-29 | Stop reason: SURG

## 2024-07-29 RX ORDER — SODIUM CHLORIDE 9 MG/ML
40 INJECTION, SOLUTION INTRAVENOUS AS NEEDED
Status: DISCONTINUED | OUTPATIENT
Start: 2024-07-29 | End: 2024-07-29 | Stop reason: HOSPADM

## 2024-07-29 RX ORDER — PROMETHAZINE HYDROCHLORIDE 25 MG/1
25 TABLET ORAL ONCE AS NEEDED
Status: DISCONTINUED | OUTPATIENT
Start: 2024-07-29 | End: 2024-07-29 | Stop reason: HOSPADM

## 2024-07-29 RX ORDER — ROCURONIUM BROMIDE 10 MG/ML
INJECTION, SOLUTION INTRAVENOUS AS NEEDED
Status: DISCONTINUED | OUTPATIENT
Start: 2024-07-29 | End: 2024-07-29 | Stop reason: SURG

## 2024-07-29 RX ORDER — HYDROCODONE BITARTRATE AND ACETAMINOPHEN 5; 325 MG/1; MG/1
1 TABLET ORAL ONCE AS NEEDED
Status: DISCONTINUED | OUTPATIENT
Start: 2024-07-29 | End: 2024-07-29 | Stop reason: HOSPADM

## 2024-07-29 RX ORDER — PANTOPRAZOLE SODIUM 40 MG/1
40 TABLET, DELAYED RELEASE ORAL
Status: DISCONTINUED | OUTPATIENT
Start: 2024-07-29 | End: 2024-07-29 | Stop reason: HOSPADM

## 2024-07-29 RX ORDER — BUPIVACAINE HYDROCHLORIDE 5 MG/ML
INJECTION, SOLUTION PERINEURAL
Status: DISCONTINUED | OUTPATIENT
Start: 2024-07-29 | End: 2024-07-29 | Stop reason: HOSPADM

## 2024-07-29 RX ORDER — SODIUM CHLORIDE 0.9 % (FLUSH) 0.9 %
3 SYRINGE (ML) INJECTION EVERY 12 HOURS SCHEDULED
Status: DISCONTINUED | OUTPATIENT
Start: 2024-07-29 | End: 2024-07-29 | Stop reason: HOSPADM

## 2024-07-29 RX ORDER — FENTANYL CITRATE 50 UG/ML
50 INJECTION, SOLUTION INTRAMUSCULAR; INTRAVENOUS
Status: DISCONTINUED | OUTPATIENT
Start: 2024-07-29 | End: 2024-07-29 | Stop reason: HOSPADM

## 2024-07-29 RX ORDER — LABETALOL HYDROCHLORIDE 5 MG/ML
5 INJECTION, SOLUTION INTRAVENOUS
Status: DISCONTINUED | OUTPATIENT
Start: 2024-07-29 | End: 2024-07-29 | Stop reason: HOSPADM

## 2024-07-29 RX ORDER — ONDANSETRON 2 MG/ML
4 INJECTION INTRAMUSCULAR; INTRAVENOUS EVERY 6 HOURS PRN
Status: DISCONTINUED | OUTPATIENT
Start: 2024-07-29 | End: 2024-07-29 | Stop reason: HOSPADM

## 2024-07-29 RX ORDER — ACETAMINOPHEN 650 MG/1
650 SUPPOSITORY RECTAL EVERY 4 HOURS PRN
Status: DISCONTINUED | OUTPATIENT
Start: 2024-07-29 | End: 2024-07-29 | Stop reason: HOSPADM

## 2024-07-29 RX ORDER — HEPARIN SODIUM 1000 [USP'U]/ML
INJECTION, SOLUTION INTRAVENOUS; SUBCUTANEOUS
Status: DISCONTINUED | OUTPATIENT
Start: 2024-07-29 | End: 2024-07-29 | Stop reason: HOSPADM

## 2024-07-29 RX ORDER — EPHEDRINE SULFATE 50 MG/ML
INJECTION, SOLUTION INTRAVENOUS AS NEEDED
Status: DISCONTINUED | OUTPATIENT
Start: 2024-07-29 | End: 2024-07-29 | Stop reason: SURG

## 2024-07-29 RX ORDER — SODIUM CHLORIDE 0.9 % (FLUSH) 0.9 %
3-10 SYRINGE (ML) INJECTION AS NEEDED
Status: DISCONTINUED | OUTPATIENT
Start: 2024-07-29 | End: 2024-07-29 | Stop reason: HOSPADM

## 2024-07-29 RX ORDER — DROPERIDOL 2.5 MG/ML
0.62 INJECTION, SOLUTION INTRAMUSCULAR; INTRAVENOUS ONCE AS NEEDED
Status: DISCONTINUED | OUTPATIENT
Start: 2024-07-29 | End: 2024-07-29 | Stop reason: HOSPADM

## 2024-07-29 RX ORDER — IPRATROPIUM BROMIDE AND ALBUTEROL SULFATE 2.5; .5 MG/3ML; MG/3ML
3 SOLUTION RESPIRATORY (INHALATION) ONCE AS NEEDED
Status: DISCONTINUED | OUTPATIENT
Start: 2024-07-29 | End: 2024-07-29 | Stop reason: HOSPADM

## 2024-07-29 RX ORDER — LIDOCAINE HYDROCHLORIDE 10 MG/ML
INJECTION, SOLUTION INFILTRATION; PERINEURAL
Status: DISCONTINUED | OUTPATIENT
Start: 2024-07-29 | End: 2024-07-29 | Stop reason: HOSPADM

## 2024-07-29 RX ORDER — ASPIRIN 81 MG/1
81 TABLET ORAL DAILY
Start: 2024-09-29

## 2024-07-29 RX ORDER — NALOXONE HCL 0.4 MG/ML
0.4 VIAL (ML) INJECTION AS NEEDED
Status: DISCONTINUED | OUTPATIENT
Start: 2024-07-29 | End: 2024-07-29 | Stop reason: HOSPADM

## 2024-07-29 RX ORDER — NITROGLYCERIN 0.4 MG/1
0.4 TABLET SUBLINGUAL
Status: DISCONTINUED | OUTPATIENT
Start: 2024-07-29 | End: 2024-07-29 | Stop reason: HOSPADM

## 2024-07-29 RX ORDER — KETOROLAC TROMETHAMINE 15 MG/ML
15 INJECTION, SOLUTION INTRAMUSCULAR; INTRAVENOUS ONCE
Status: COMPLETED | OUTPATIENT
Start: 2024-07-29 | End: 2024-07-29

## 2024-07-29 RX ORDER — MEPERIDINE HYDROCHLORIDE 25 MG/ML
12.5 INJECTION INTRAMUSCULAR; INTRAVENOUS; SUBCUTANEOUS
Status: DISCONTINUED | OUTPATIENT
Start: 2024-07-29 | End: 2024-07-29 | Stop reason: HOSPADM

## 2024-07-29 RX ORDER — PROPOFOL 10 MG/ML
VIAL (ML) INTRAVENOUS AS NEEDED
Status: DISCONTINUED | OUTPATIENT
Start: 2024-07-29 | End: 2024-07-29 | Stop reason: SURG

## 2024-07-29 RX ORDER — ONDANSETRON 2 MG/ML
4 INJECTION INTRAMUSCULAR; INTRAVENOUS ONCE AS NEEDED
Status: DISCONTINUED | OUTPATIENT
Start: 2024-07-29 | End: 2024-07-29 | Stop reason: HOSPADM

## 2024-07-29 RX ORDER — LIDOCAINE HYDROCHLORIDE 10 MG/ML
INJECTION, SOLUTION EPIDURAL; INFILTRATION; INTRACAUDAL; PERINEURAL AS NEEDED
Status: DISCONTINUED | OUTPATIENT
Start: 2024-07-29 | End: 2024-07-29 | Stop reason: SURG

## 2024-07-29 RX ORDER — PROTAMINE SULFATE 10 MG/ML
INJECTION, SOLUTION INTRAVENOUS
Status: DISCONTINUED | OUTPATIENT
Start: 2024-07-29 | End: 2024-07-29 | Stop reason: HOSPADM

## 2024-07-29 RX ORDER — GLYCOPYRROLATE 0.2 MG/ML
INJECTION INTRAMUSCULAR; INTRAVENOUS AS NEEDED
Status: DISCONTINUED | OUTPATIENT
Start: 2024-07-29 | End: 2024-07-29 | Stop reason: SURG

## 2024-07-29 RX ORDER — DROPERIDOL 2.5 MG/ML
0.62 INJECTION, SOLUTION INTRAMUSCULAR; INTRAVENOUS
Status: DISCONTINUED | OUTPATIENT
Start: 2024-07-29 | End: 2024-07-29 | Stop reason: HOSPADM

## 2024-07-29 RX ORDER — PROMETHAZINE HYDROCHLORIDE 25 MG/1
25 SUPPOSITORY RECTAL ONCE AS NEEDED
Status: DISCONTINUED | OUTPATIENT
Start: 2024-07-29 | End: 2024-07-29 | Stop reason: HOSPADM

## 2024-07-29 RX ORDER — MIDAZOLAM HYDROCHLORIDE 1 MG/ML
INJECTION, SOLUTION INTRAMUSCULAR; INTRAVENOUS AS NEEDED
Status: DISCONTINUED | OUTPATIENT
Start: 2024-07-29 | End: 2024-07-29 | Stop reason: SURG

## 2024-07-29 RX ORDER — SODIUM CHLORIDE 9 MG/ML
INJECTION, SOLUTION INTRAVENOUS
Status: COMPLETED | OUTPATIENT
Start: 2024-07-29 | End: 2024-07-29

## 2024-07-29 RX ORDER — DEXAMETHASONE SODIUM PHOSPHATE 4 MG/ML
INJECTION, SOLUTION INTRA-ARTICULAR; INTRALESIONAL; INTRAMUSCULAR; INTRAVENOUS; SOFT TISSUE AS NEEDED
Status: DISCONTINUED | OUTPATIENT
Start: 2024-07-29 | End: 2024-07-29 | Stop reason: SURG

## 2024-07-29 RX ORDER — SODIUM CHLORIDE 0.9 % (FLUSH) 0.9 %
10 SYRINGE (ML) INJECTION AS NEEDED
Status: DISCONTINUED | OUTPATIENT
Start: 2024-07-29 | End: 2024-07-29 | Stop reason: HOSPADM

## 2024-07-29 RX ORDER — FENTANYL CITRATE 50 UG/ML
INJECTION, SOLUTION INTRAMUSCULAR; INTRAVENOUS AS NEEDED
Status: DISCONTINUED | OUTPATIENT
Start: 2024-07-29 | End: 2024-07-29 | Stop reason: SURG

## 2024-07-29 RX ORDER — HYDROMORPHONE HYDROCHLORIDE 1 MG/ML
0.5 INJECTION, SOLUTION INTRAMUSCULAR; INTRAVENOUS; SUBCUTANEOUS
Status: DISCONTINUED | OUTPATIENT
Start: 2024-07-29 | End: 2024-07-29 | Stop reason: HOSPADM

## 2024-07-29 RX ORDER — HYDRALAZINE HYDROCHLORIDE 20 MG/ML
5 INJECTION INTRAMUSCULAR; INTRAVENOUS
Status: DISCONTINUED | OUTPATIENT
Start: 2024-07-29 | End: 2024-07-29 | Stop reason: HOSPADM

## 2024-07-29 RX ADMIN — GLYCOPYRROLATE 0.2 MG: 0.2 INJECTION INTRAMUSCULAR; INTRAVENOUS at 11:17

## 2024-07-29 RX ADMIN — KETOROLAC TROMETHAMINE 15 MG: 15 INJECTION, SOLUTION INTRAMUSCULAR; INTRAVENOUS at 14:31

## 2024-07-29 RX ADMIN — PROPOFOL 200 MG: 10 INJECTION, EMULSION INTRAVENOUS at 11:04

## 2024-07-29 RX ADMIN — FENTANYL CITRATE 100 MCG: 50 INJECTION, SOLUTION INTRAMUSCULAR; INTRAVENOUS at 11:01

## 2024-07-29 RX ADMIN — ATROPINE SULFATE 0.2 MG: 0.4 INJECTION, SOLUTION INTRAVENOUS at 11:35

## 2024-07-29 RX ADMIN — EPHEDRINE SULFATE 5 MG: 50 INJECTION INTRAVENOUS at 11:23

## 2024-07-29 RX ADMIN — SUGAMMADEX 200 MG: 100 INJECTION, SOLUTION INTRAVENOUS at 13:15

## 2024-07-29 RX ADMIN — SODIUM CHLORIDE 1 ML/KG/HR: 9 INJECTION, SOLUTION INTRAVENOUS at 08:59

## 2024-07-29 RX ADMIN — PANTOPRAZOLE SODIUM 40 MG: 40 TABLET, DELAYED RELEASE ORAL at 14:31

## 2024-07-29 RX ADMIN — ROCURONIUM BROMIDE 50 MG: 10 SOLUTION INTRAVENOUS at 11:05

## 2024-07-29 RX ADMIN — LIDOCAINE HYDROCHLORIDE 50 MG: 10 INJECTION, SOLUTION EPIDURAL; INFILTRATION; INTRACAUDAL; PERINEURAL at 11:03

## 2024-07-29 RX ADMIN — MIDAZOLAM HYDROCHLORIDE 2 MG: 1 INJECTION, SOLUTION INTRAMUSCULAR; INTRAVENOUS at 10:54

## 2024-07-29 RX ADMIN — PHENYLEPHRINE HYDROCHLORIDE 0.25 MCG/KG/MIN: 10 INJECTION INTRAVENOUS at 11:55

## 2024-07-29 RX ADMIN — DEXAMETHASONE SODIUM PHOSPHATE 4 MG: 4 INJECTION, SOLUTION INTRAMUSCULAR; INTRAVENOUS at 11:09

## 2024-07-29 RX ADMIN — ONDANSETRON 4 MG: 2 INJECTION INTRAMUSCULAR; INTRAVENOUS at 12:58

## 2024-07-29 NOTE — H&P
Cardiology Consult/H&P     Jay Nash Jr.  1965  [phone]  [phone] (work)    07/29/24    DATE OF ADMISSION: 7/29/2024  [facility] August[facility] MD  [address] [facility] [address] / [facility] [address]  Referring Provider: Helio Polanco MD     CC: PAF    Problem List:   Paroxysmal atrial fibrillation  CHADSVASc = 0, on ASA  24-hour Holter monitor 1/30/2018: Predominantly normal sinus rhythm, average heart rate 58 bpm ( bpm)   2 weeks Zio patch 7/5/2018 - 3% AF burden, average HR 53 bpm ( bpm), 5 beat run of NSVT  GXT 09/2018 - reportedly negative for ischemia - data deficit  Initiated on pill-in-pocket Flecainide  Echocardiogram 7/30/18: EF 60%, no significant valvular disease  12 day holter monitor 9/2022: NSR, no afib  PVC/NSVT  Noted on Zio patch, 07/2018, 5 beat run of NSVT  BPH  Tubular adenoma of rectum  Surgical history  Oral surgery    History of Present Illness:   Jay Nash is a 58-year-old male with above past medical history who presents today for EP study +/- pulmonary vein ablation.  He reports symptoms of heart racing when in A-fib.  He has had no recent episodes but 2 episodes in the last year despite being on flecainide.  He started Eliquis 2 weeks ago.  He denies recent infection, fever, chills, bleeding, signs of CVA/TIA.      No Known Allergies    Prior to Admission Medications       Prescriptions Last Dose Informant Patient Reported? Taking?    apixaban (ELIQUIS) 5 MG tablet tablet 7/28/2024 Self, Medication Bottle No Yes    Take 1 tablet by mouth 2 (Two) Times a Day.    Patient taking differently:  Take 1 tablet by mouth 2 (Two) Times a Day. Hold morning of Ablation.    aspirin 81 MG EC tablet 7/15/2024 Self, Medication Bottle Yes No    Take 1 tablet by mouth Daily.    Patient not taking:  Reported on 7/22/2024    flecainide (TAMBOCOR) 50 MG tablet 7/23/2024 Self, Medication Bottle No No    TAKE 1 TABLET TWICE A DAY    Patient taking  differently:  Take 3 tablets by mouth 2 (Two) Times a Day. Two 50mg tablets in the Morning and One 50mg tablet at night.  Last dose will be evening of 7- prior to Ablation.              Current Facility-Administered Medications:     acetaminophen (TYLENOL) tablet 650 mg, 650 mg, Oral, Q4H PRN, Carmelina Zimmer PA    nitroglycerin (NITROSTAT) SL tablet 0.4 mg, 0.4 mg, Sublingual, Q5 Min PRN, Carmelina Zimmer PA    sodium chloride 0.9 % flush 10 mL, 10 mL, Intravenous, PRN, Carmelina Zimmer PA    sodium chloride 0.9 % flush 3 mL, 3 mL, Intravenous, Q12H, Carmelina Zimmer PA    sodium chloride 0.9 % infusion 40 mL, 40 mL, Intravenous, PRN, Carmelina Zimmer, PA    sodium chloride 0.9 % infusion, 1 mL/kg/hr (Order-Specific), Intravenous, Continuous, Carmelina Zimmer PA, Last Rate: 92.5 mL/hr at 07/29/24 0859, 1 mL/kg/hr at 07/29/24 0859    Social History     Socioeconomic History    Marital status:    Tobacco Use    Smoking status: Never    Smokeless tobacco: Never   Vaping Use    Vaping status: Never Used   Substance and Sexual Activity    Alcohol use: Yes     Alcohol/week: 3.0 standard drinks of alcohol     Types: 2 Glasses of wine, 1 Cans of beer per week     Comment: [place] every now and then    Drug use: No    Sexual activity: Yes     Partners: Female       History reviewed. No pertinent family history.    REVIEW OF SYSTEMS:   CONSTITUTIONAL:         No weight loss, fever, chills, weakness or fatigue.   HEENT:                            No visual loss, blurred vision, double vision, yellow sclerae.                                             No hearing loss, congestion, sore throat.   SKIN:                                No rashes, urticaria, ulcers, sores.     RESPIRATORY:               No shortness of breath, hemoptysis, cough, sputum.   GI:                                     No anorexia, nausea, vomiting, diarrhea. No abdominal pain, melena.   :                                   No burning on  "urination, hematuria or increased frequency.  ENDOCRINE:                   No diaphoresis, cold or heat intolerance. No polyuria or polydipsia.   NEURO:                            No headache, dizziness, syncope, paralysis, ataxia, or parasthesias.                                            No change in bowel or bladder control. No history of CVA/TIA  MUSCULOSKELETAL:    No muscle, back pain, joint pain or stiffness.   HEMATOLOGY:               No anemia, bleeding, bruising. No history of DVT/PE.  PSYCH:                            No history of depression, anxiety    Vitals:    07/29/24 0831 07/29/24 0833   BP: 130/85 128/90   BP Location: Right arm Left arm   Patient Position: Lying Lying   Pulse: (!) 45    Resp: 16    Temp: 97.4 °F (36.3 °C)    TempSrc: Temporal    SpO2: 95%    Weight: 89.6 kg (197 lb 9.6 oz)    Height: 188 cm (74\")          Vital Sign Min/Max for last 24 hours  Temp  Min: 97.4 °F (36.3 °C)  Max: 97.4 °F (36.3 °C)   BP  Min: 128/90  Max: 130/85   Pulse  Min: 45  Max: 45   Resp  Min: 16  Max: 16   SpO2  Min: 95 %  Max: 95 %   No data recorded    No intake or output data in the 24 hours ending 07/29/24 0946          Physical Exam:  GEN: Well nourished, Well- developed  No acute distress  HEENT: Normocephalic, Atraumatic, PERRLA, moist mucous membranes  NECK: supple, NO JVD, no thyromegaly, no lymphadenopathy  CARDIAC: S1S2 bradycardic, regular rhythm.  No murmur, gallop, rub  LUNGS: Clear to ausculation, normal respiratory effort  ABDOMEN: Soft, nontender, normal bowel sounds  EXTREMITIES:No gross deformities,  No clubbing, cyanosis, or edema  SKIN: Warm, dry  NEURO: No focal deficits  PSYCHIATRIC: Normal affect and mood      I personally viewed and interpreted the patient's EKG/Telemetry/lab data    Data:   Results from last 7 days   Lab Units 07/22/24  1230   WBC 10*3/mm3 5.27   HEMOGLOBIN g/dL 15.3   HEMATOCRIT % 46.4   PLATELETS 10*3/mm3 251     Results from last 7 days   Lab Units " 07/22/24  1230   SODIUM mmol/L 142   POTASSIUM mmol/L 4.9   CHLORIDE mmol/L 107   CO2 mmol/L 28.0   BUN mg/dL 22*   CREATININE mg/dL 1.10   GLUCOSE mg/dL 96      Results from last 7 days   Lab Units 07/22/24  1230   HEMOGLOBIN A1C % 5.10             Results from last 7 days   Lab Units 07/22/24  1230   PROTIME Seconds 13.9   INR  1.06                 No intake or output data in the 24 hours ending 07/29/24 0946      Telemetry: Sinus bradycardia, heart rate 45 bpm          Assessment and Plan:   1. PAF:   - on Flecainide total of 150 mg daily.  Two episodes in the last year on lower dose of flecainide. Pt also concerned about possible flecainide induced prostatitis.  Last dose of flecainide 7/23/2024.  -Will plan for EP study +/- PVA/PFA today. The risks, benefits, and alternatives of the procedure have been reviewed and the patient wishes to proceed.       2. PVC: stable, no symptoms     3. Chadsvasc=0, usually on ASA only and PRN Eliquis.  Started Eliquis 5 mg twice daily 7/15/2024 in preparation for procedure today.    4. Bradycardia- asymptomatic    Electronically signed by MERA Lawson, 07/29/24, 10:03 AM EDT.

## 2024-07-29 NOTE — ANESTHESIA PROCEDURE NOTES
Airway  Urgency: elective    Date/Time: 7/29/2024 11:07 AM  Airway not difficult    General Information and Staff    Patient location during procedure: OR    Indications and Patient Condition  Indications for airway management: airway protection    Preoxygenated: yes  MILS not maintained throughout  Mask difficulty assessment: 2 - vent by mask + OA or adjuvant +/- NMBA    Final Airway Details  Final airway type: endotracheal airway      Successful airway: ETT  Cuffed: yes   Successful intubation technique: video laryngoscopy  Facilitating devices/methods: intubating stylet  Endotracheal tube insertion site: oral  Blade: Mckeon  Blade size: 4  ETT size (mm): 7.5  Cormack-Lehane Classification: grade I - full view of glottis  Placement verified by: chest auscultation and capnometry   Inital cuff pressure (cm H2O): 7  Measured from: lips  ETT/EBT  to lips (cm): 23  Number of attempts at approach: 1  Assessment: lips, teeth, and gum same as pre-op and atraumatic intubation    Additional Comments  Negative epigastric sounds, Breath sound equal bilaterally with symmetric chest rise and fall

## 2024-07-29 NOTE — ANESTHESIA POSTPROCEDURE EVALUATION
Patient: Jay Nash Jr.    Procedure Summary       Date: 07/29/24 Room / Location: [facility] / [facility] INVASIVE [facility]    Anesthesia Start: 1054 Anesthesia Stop: 1328    Procedure: Ablation atrial fibrillation w/ PFA, start Eliquis 2 weeks prior, hold Flecainide 5 days prior, hold Eliquis morning of, schedule last week of July Diagnosis:       Paroxysmal atrial fibrillation      (afib)    Providers: Helio Polanco MD Provider: Gregorio Ochoa MD    Anesthesia Type: general ASA Status: 3            Anesthesia Type: general    Vitals  No vitals data found for the desired time range.          Post Anesthesia Care and Evaluation    Patient location during evaluation: bedside (Cath Lab)  Patient participation: complete - patient participated  Level of consciousness: awake and alert  Pain score: 0  Pain management: adequate    Airway patency: patent  Anesthetic complications: No anesthetic complications  PONV Status: none  Cardiovascular status: hemodynamically stable and acceptable  Respiratory status: nonlabored ventilation, acceptable, nasal cannula and spontaneous ventilation  Hydration status: acceptable    Comments: Report given to Cath Lab team at bedside  No anesthesia care post op

## 2024-07-29 NOTE — ANESTHESIA PREPROCEDURE EVALUATION
Anesthesia Evaluation                  Airway   Mallampati: I  TM distance: >3 FB  Neck ROM: full  No difficulty expected  Dental      Pulmonary    Cardiovascular     ECG reviewed    (+) dysrhythmias Atrial Fib      Neuro/Psych  GI/Hepatic/Renal/Endo      Musculoskeletal     Abdominal    Substance History      OB/GYN          Other                    Anesthesia Plan    ASA 3     general     intravenous induction     Anesthetic plan, risks, benefits, and alternatives have been provided, discussed and informed consent has been obtained with: patient.    Plan discussed with CRNA.    CODE STATUS:

## 2024-07-30 ENCOUNTER — CALL CENTER PROGRAMS (OUTPATIENT)
Dept: CALL CENTER | Facility: HOSPITAL | Age: 59
End: 2024-07-30
Payer: COMMERCIAL

## 2024-07-30 LAB
ACT BLD: 122 SECONDS (ref 82–152)
ACT BLD: 367 SECONDS (ref 82–152)
ACT BLD: 385 SECONDS (ref 82–152)
ACT BLD: 391 SECONDS (ref 82–152)

## 2024-07-30 NOTE — OUTREACH NOTE
PCI/Device Survey      Flowsheet Row Responses   Facility patient discharged from? [facility]   Procedure date 07/29/24   Procedure (if device, specify in description) Ablation  [bilat groin]   Performing MD Dr. Helio Polanco   Attempt successful? Yes   Call start time 0915   Call end time 0934   Nursing interventions Patient education provided   Is the patient taking prescribed medications: Apixaban, ASA   Nursing intervention Reminded to continue to take prescribed medications, Nurse provided patient education   Does the patient have any of the following symptoms related to the cath/surgical site? --  [bilat groin sites with dressings in place, no issues noted per pt. Pt v/u of his wound care and restrictions.]   Nursing intervention Patient education provided   Does the patient have an appointment scheduled with the cardiologist? Yes   If the patient is a current smoker, are they able to teach back resources for cessation? Not a smoker   Did the patient feel prepared to go home on the same day as the procedure? Yes   Is the patient satisfied with the same day discharge process? Yes   PCI/Device call completed Yes            Hanna RODRIGUEZ - Registered Nurse

## 2024-08-09 ENCOUNTER — OFFICE VISIT (OUTPATIENT)
Dept: ORTHOPEDIC SURGERY | Facility: CLINIC | Age: 59
End: 2024-08-09
Payer: COMMERCIAL

## 2024-08-09 VITALS
HEIGHT: 74 IN | WEIGHT: 199 LBS | SYSTOLIC BLOOD PRESSURE: 122 MMHG | BODY MASS INDEX: 25.54 KG/M2 | DIASTOLIC BLOOD PRESSURE: 64 MMHG

## 2024-08-09 DIAGNOSIS — M79.671 RIGHT FOOT PAIN: Primary | ICD-10-CM

## 2024-08-09 PROCEDURE — 99214 OFFICE O/P EST MOD 30 MIN: CPT | Performed by: ORTHOPAEDIC SURGERY

## 2024-08-09 NOTE — PROGRESS NOTES
ESTABLISHED PATIENT    Patient: Jay Nash Jr.  : 1965    Primary Care Provider: Augusto Chaney MD    Requesting Provider: As above    Follow-up (2.5 year follow up -- Metatarsalgia of right foot/)      History    Chief Complaint: Right foot pain    History of Present Illness: This is an extremely pleasant 58-year-old gentleman who saw 2 years ago with metatarsalgia.  I have recommended custom orthotics and taping or splinting the toes.  He notes that the taping the toes and the orthotics do help, but the problem has not resolved.  He has a lot more pain trying to walk barefoot.  He feels better with padding.  He does have some metatarsal pads in his shoe, he is not certain they are always in the right place.  He never got the orthotics adjusted.  He is wondering if there is anything else that could be done.  He is planning a long hike next year on a famous [employer] trail in [place].      Current Outpatient Medications on File Prior to Visit   Medication Sig Dispense Refill    apixaban (ELIQUIS) 5 MG tablet tablet Take 1 tablet by mouth 2 (Two) Times a Day for 62 days. 60 tablet 1    [START ON 2024] aspirin 81 MG EC tablet Take 1 tablet by mouth Daily. Resume after finished with Eliquis script.      pantoprazole (PROTONIX) 40 MG EC tablet Take 1 tablet by mouth Daily. 30 tablet 0     No current facility-administered medications on file prior to visit.      No Known Allergies   Past Medical History:   Diagnosis Date    Abnormal heart rhythm     Atrial fibrillation     Fracture, tibia and fibula Years ago    Neuroma of foot About a year or so ago    Why I am coming to you today    Tear of meniscus of knee 2019    Knee scoped to fix bucket handle tear     Past Surgical History:   Procedure Laterality Date    CARDIAC ELECTROPHYSIOLOGY PROCEDURE N/A 2024    Procedure: Ablation atrial fibrillation w/ PFA, start Eliquis 2 weeks prior, hold Flecainide 5 days prior, hold Eliquis morning of,  "schedule last week of July;  Surgeon: Helio Polanco MD;  Location: [facility] INVASIVE LOC[facility];  Service: Cardiovascular;  Laterality: N/A;    COLONOSCOPY      KNEE SURGERY Left     scope    WISDOM TOOTH EXTRACTION       Family History   Problem Relation Age of Onset    Scoliosis Mother     Cancer Father         Squamous      Social History     Socioeconomic History    Marital status:    Tobacco Use    Smoking status: Never    Smokeless tobacco: Never   Vaping Use    Vaping status: Never Used   Substance and Sexual Activity    Alcohol use: Yes     Alcohol/week: 3.0 standard drinks of alcohol     Types: 1 Glasses of wine, 2 Cans of beer per week     Comment: [place] every now and then    Drug use: No    Sexual activity: Yes     Partners: Female        Review of Systems   Constitutional: Negative.    HENT: Negative.     Eyes: Negative.    Respiratory: Negative.     Cardiovascular: Negative.    Gastrointestinal: Negative.    Endocrine: Negative.    Genitourinary: Negative.    Musculoskeletal:  Positive for arthralgias.   Skin: Negative.    Allergic/Immunologic: Negative.    Neurological: Negative.    Hematological: Negative.    Psychiatric/Behavioral: Negative.         The following portions of the patient's history were reviewed and updated as appropriate: allergies, current medications, past family history, past medical history, past social history, past surgical history, and problem list.    Physical Exam:   /64   Ht 188 cm (74\")   Wt 90.3 kg (199 lb)   BMI 25.55 kg/m²   GENERAL: Body habitus: normal weight for height    Lower extremity edema: Left: none; Right: none    Gait: normal     Mental Status:  awake and alert; oriented to person, place, and time  MSK:  Tibia:  Right:  non tender; Left:  non tender        Ankle:  Right: non tender; Left:  non tender        Foot:  Right:   No swelling in the metatarsal phalangeal joints, mild flexible hammertoes, mildly tender under second and third " metatarsal heads, mild metatarsus adductus and mild cavus arches, very tight in the hamstrings and heel cords bilaterally ; Left:   Nontender, similar foot alignment    NEURO Sensation:  intact    Medical Decision Making    Data Review:   ordered and reviewed x-rays today    Assessment/Plan/Diagnosis/Treatment Options:   1. Right foot pain  He no longer has any synovitis in the joints, he does have residual metatarsalgia.  I explained that there is no surgery that I have that can change this.  I explained our feet develop different pressure points over time.  The way to treat this is to manage the symptoms.  I showed him again how to use a 2 loop Budin splint, he can try that instead of the tape.  I also put a new metatarsal pad on his orthotic.  We tried several different sizes.  I also gave him a prescription for new custom orthotics since his are several years old.  I also recommended stretching hamstrings and Achilles.  I explained that tightness in these structures increases forefoot pressure.  We went over everything in detail, I will be happy to see him anytime  - XR Foot 2 View Right          Wanda Grant MD

## 2024-08-29 ENCOUNTER — HOSPITAL ENCOUNTER (OUTPATIENT)
Dept: CARDIOLOGY | Facility: HOSPITAL | Age: 59
Discharge: HOME | End: 2024-08-29
Admitting: NURSE PRACTITIONER
Payer: COMMERCIAL

## 2024-08-29 ENCOUNTER — OFFICE VISIT (OUTPATIENT)
Dept: CARDIOLOGY | Facility: HOSPITAL | Age: 59
End: 2024-08-29
Payer: COMMERCIAL

## 2024-08-29 VITALS
RESPIRATION RATE: 16 BRPM | HEART RATE: 58 BPM | SYSTOLIC BLOOD PRESSURE: 118 MMHG | WEIGHT: 203.19 LBS | TEMPERATURE: 97.3 F | HEIGHT: 74 IN | BODY MASS INDEX: 26.08 KG/M2 | OXYGEN SATURATION: 96 % | DIASTOLIC BLOOD PRESSURE: 67 MMHG

## 2024-08-29 DIAGNOSIS — Z98.890 HISTORY OF CARDIAC RADIOFREQUENCY ABLATION (RFA): ICD-10-CM

## 2024-08-29 DIAGNOSIS — I48.0 PAROXYSMAL ATRIAL FIBRILLATION: Primary | ICD-10-CM

## 2024-08-29 DIAGNOSIS — I49.3 PVC (PREMATURE VENTRICULAR CONTRACTION): ICD-10-CM

## 2024-08-29 DIAGNOSIS — I48.0 PAROXYSMAL ATRIAL FIBRILLATION: ICD-10-CM

## 2024-08-29 PROCEDURE — 93005 ELECTROCARDIOGRAM TRACING: CPT | Performed by: NURSE PRACTITIONER

## 2024-08-29 NOTE — PROGRESS NOTES
"[facility] Heart and Vascular    Chief Complaint  Atrial Fibrillation    Subjective    History of Present Illness {CC  Problem List  Visit  Diagnosis   Encounters  Notes  Medications  Labs  Result Review Imaging  Media :23}     Jay Nash Jr. presents to [facility] CARDIOLOGY for   History of Present Illness     58-year-old male with paroxysmal atrial fibrillation, PVCs/NSVT.    Status post pulsed field ablation, RFA of atrial fibrillation.    Pt noted groin discomfort for the 1st week.  Bruising has now resolved. NO known afib since PVA.        Pt reports occassional palpitations, but does not see Afib on [employer]    No CP, pressure, dizziness, near syncope, syncope, dyspnea.      Back to exercising not concerns.     Objective     Vital Signs:   Vitals:    08/29/24 1535 08/29/24 1537   BP: 109/62 118/67   BP Location: Left arm Left arm   Patient Position: Standing Sitting   Cuff Size: Adult Adult   Pulse: 65 58   Resp:  16   Temp:  97.3 °F (36.3 °C)   TempSrc:  Temporal   SpO2: 96% 96%   Weight:  92.2 kg (203 lb 3 oz)   Height:  188 cm (74\")     Body mass index is 26.09 kg/m².  Physical Exam  Vitals reviewed.   Constitutional:       General: He is not in acute distress.  Cardiovascular:      Rate and Rhythm: Normal rate and regular rhythm.   Pulmonary:      Effort: Pulmonary effort is normal.      Breath sounds: Normal breath sounds.   Skin:     Coloration: Skin is not pale.   Neurological:      Mental Status: He is alert.   Psychiatric:         Mood and Affect: Mood normal.         Behavior: Behavior normal. Behavior is cooperative.              Result Review  Data Reviewed:{ Labs  Result Review  Imaging  Med Tab  Media :23}   Echocardiogram 7/24/2024: EF 56 to 60%, RVSP less than 35 mmHg    GXT 09/2018 - reportedly negative for ischemia - data deficit     Lab Results   Component Value Date    WBC 5.27 07/22/2024    HGB 15.3 07/22/2024 " "   HCT 46.4 07/22/2024    MCV 91.7 07/22/2024     07/22/2024     Lab Results   Component Value Date    GLUCOSE 96 07/22/2024    CALCIUM 9.4 07/22/2024     07/22/2024    K 4.9 07/22/2024    CO2 28.0 07/22/2024     07/22/2024    BUN 22 (H) 07/22/2024    CREATININE 1.10 07/22/2024    EGFR 77.3 07/22/2024    BCR 20.0 07/22/2024    ANIONGAP 7.0 07/22/2024     No results found for: \"TSH\"                Assessment and Plan {CC Problem List  Visit Diagnosis  ROS  Review (Popup)  Health Maintenance  Quality  BestPractice  Medications  SmartSets  SnapShot Encounters  Media :23}   1. Paroxysmal atrial fibrillation    - ECG 12 Lead; sinus bradycardia 59 bpm  Asymptomatic with bradycardia    Currently on Eliquis  Eliquis samples: Quantity 4, Lot: [id number], exp: 01/26    2. PVC (premature ventricular contraction)  Patient with history of PVCs.  No PVCs noted today.  Patient had been symptomatic with PVCs in the past.  That may be why he is having palpitations.  If his symptoms continue or worsen may need to repeat cardiac heart monitor.  But we will monitor conservatively at this time.  - ECG 12 Lead; Future    3. History of cardiac radiofrequency ablation (RFA)  Discussed postprocedural expectations and management.  - ECG 12 Lead; Future    Patient will follow-up with cardiology as scheduled.  Follow-up in heart valve center as needed or as determined by cardiology.          Follow Up {Instructions Charge Capture  Follow-up Communications :23}   Return if symptoms worsen or fail to improve.    Patient was given instructions and counseling regarding his condition or for health maintenance advice. Please see specific information pulled into the AVS if appropriate.  Patient was instructed to call the Heart and Valve Center with any questions, concerns, or worsening symptoms.  "

## 2024-08-30 LAB
QT INTERVAL: 416 MS
QTC INTERVAL: 411 MS

## 2024-10-14 ENCOUNTER — TELEPHONE (OUTPATIENT)
Dept: ORTHOPEDIC SURGERY | Facility: CLINIC | Age: 59
End: 2024-10-14
Payer: COMMERCIAL

## 2024-10-14 NOTE — TELEPHONE ENCOUNTER
Corazon with [employer] called and requested last office notes and demographic sheet for this patient for continuity of care. Last office notes and demographics sheet faxed to [phone].

## 2024-10-30 ENCOUNTER — OFFICE VISIT (OUTPATIENT)
Dept: CARDIOLOGY | Facility: CLINIC | Age: 59
End: 2024-10-30
Payer: COMMERCIAL

## 2024-10-30 VITALS
SYSTOLIC BLOOD PRESSURE: 120 MMHG | WEIGHT: 200.6 LBS | DIASTOLIC BLOOD PRESSURE: 72 MMHG | HEIGHT: 74 IN | OXYGEN SATURATION: 96 % | BODY MASS INDEX: 25.74 KG/M2 | HEART RATE: 58 BPM

## 2024-10-30 DIAGNOSIS — I48.0 PAF (PAROXYSMAL ATRIAL FIBRILLATION): Primary | ICD-10-CM

## 2024-10-30 DIAGNOSIS — R00.1 BRADYCARDIA, SINUS: ICD-10-CM

## 2024-10-30 DIAGNOSIS — I49.3 PVC (PREMATURE VENTRICULAR CONTRACTION): ICD-10-CM

## 2024-10-30 NOTE — PROGRESS NOTES
"Jay Nash Jr.  1965  [phone]    10/30/2024    [facility] CARDIOLOGY     Referring Provider: No ref. provider found     Augusto Chaney MD  [address] [address]    Chief Complaint   Patient presents with    Paroxysmal atrial fibrillation       Problem List:   Paroxysmal atrial fibrillation  CHADSVASc = 0, on ASA  24-hour Holter monitor 1/30/2018: Predominantly normal sinus rhythm, average heart rate 58 bpm ( bpm)   2 weeks Zio patch 7/5/2018 - 3% AF burden, average HR 53 bpm ( bpm), 5 beat run of NSVT  GXT 09/2018 - reportedly negative for ischemia - data deficit  Initiated on pill-in-pocket Flecainide  Echocardiogram 7/30/18: EF 60%, no significant valvular disease  12 day holter monitor 9/2022: NSR, no afib  PVC/NSVT  Noted on Zio patch, 07/2018, 5 beat run of NSVT  BPH  Tubular adenoma of rectum  Surgical history  Oral surgery  Allergies  No Known Allergies    Current Medications    Current Outpatient Medications:     aspirin 81 MG EC tablet, Take 1 tablet by mouth Daily. Resume after finished with Eliquis script., Disp: , Rfl:     History of Present Illness     Pt presents for follow up of AF/PVC . Since we last saw the pt, pt denies any AF episodes, SOB, CP, LH, and dizziness. Denies any hospitalizations, ER visits, bleeding, or TIA/CVA symptoms. Overall feels well. BP stable        Vitals:    10/30/24 1517   BP: 120/72   BP Location: Left arm   Patient Position: Sitting   Cuff Size: Adult   Pulse: 58   SpO2: 96%   Weight: 91 kg (200 lb 9.6 oz)   Height: 188 cm (74\")     Body mass index is 25.76 kg/m².  PE:  General: NAD  Neck: no JVD, no carotid bruits, no TM  Heart RRR, NL S1, S2, S4 present, no rubs, murmurs  Lungs: CTA, no wheezes, rhonchi, or rales  Abd: soft, non-tender, NL BS  Ext: No musculoskeletal deformities, no edema, cyanosis, or clubbing  Psych: normal mood and affect    Diagnostic Data:        ECG 12 Lead    Date/Time: " 10/30/2024 3:27 PM  Performed by: Helio Polanco MD    Authorized by: Helio Polanco MD  Comparison: compared with previous ECG from 6/26/2024  Similar to previous ECG  Rhythm: sinus bradycardia  BPM: 58                 1. PAF (paroxysmal atrial fibrillation)    2. PVC (premature ventricular contraction)    3. Bradycardia, sinus          Plan:    1. PAF status post PFA of the pulmonary veins approximate 3 months ago overall doing very well with no clinical recurrence.   Okay for aspirin daily only.     2. PVC: stable, no symptoms      3. Bradycardia- asymptomatic    F/up in 3 months

## 2024-11-05 ENCOUNTER — TELEPHONE (OUTPATIENT)
Dept: SLEEP MEDICINE | Age: 59
End: 2024-11-05
Payer: COMMERCIAL

## 2024-11-07 NOTE — PROGRESS NOTES
Sleep Clinic Video Visit Follow Up Note    The patient is located at their home address in [place], [place]. The patient presents today for telehealth service.  This service was conducted via audio/video technology through a secure [employer] video visit connection through Epic.  This provider is located in [facility].  Patient stated they are in a secure environment for the session.  Patient's condition being diagnosed/treated is appropriate for telemedicine.  The provider identified himself as well as his credentials.  The patient, and/or patient's guardian, consent to be seen remotely, and when consent is given they understanding that the consent allows for patient identifiable information to be sent to a third-party as needed.  They may refuse to be seen remotely at any time.  The electronic data is encrypted and password protected, and the patient and/or guardian has been advised of the potential risk to privacy not withstanding such measures.  Patient identifiers used: Name and date of birth.     You have chosen to receive care through a telehealth visit.  Do you consent to use a video/audio connection for your medical care today? Yes.  There were no problems with video or audio connection      Chief Complaint  Follow-up sleep problem    Subjective     History of Present Illness (from previous encounter on 6/8/2022 with Dr. Beltran):  Patient's had a history of snoring for some time.  He estimates that it 15 years.  Its been worse in the past few months.  He denies any noted apneas.  He has several family members diagnosed with obstructive sleep apnea.  He says his Fitbit shows that he has oxygen desaturations at night.  He denies awakening gasping for breath.  He is sometimes rested and morning but often not.  He denies morning headaches.     He denies awakening with dry mouth.  He has broken his nose previously.  He denies having trouble breathing through his nose.  He denies being sleepy during the  day.  He denies kicking or jerking his legs at night.  He has some occasional back pain at night but does not think it keeps him awake.  He has had atrial fibrillation known for the past 4 years.     He goes to bed between 9 PM and 10 PM.  He will fall asleep in 2 to 15 minutes.  He awakens 2-3 times during the night.  He thinks he gets about 7 hours of sleep and is sometimes rested.  He denies any history of hypertension or diabetes.  He has been diagnosed with atrial fibrillation. (End copied text)    Interval History:  Jay Nash Jr. is a 59 y.o. male who presents for follow-up.  The patient was last seen by Dr. Beltran on 6/8/2022 at which time a home sleep test was ordered. He continues to snore and sleeps about 6.5-7 hours. He wakes 1-3 times per night to go to the restroom. He is restless. He had an ablation for afib this year. Afib is improved.     Further details are as follows:    Montrose Scale is: 4/24    Weight:    Current Weight: 198 lbs    Weight change in the last year:  loss: 8-10 lbs    The patient's relevant past medical, surgical, family, and social history reviewed and updated in Epic as appropriate.    PMH:    Past Medical History:   Diagnosis Date    Abnormal heart rhythm     Atrial fibrillation     Fracture, tibia and fibula Years ago    Neuroma of foot About a year or so ago    Why I am coming to you today    Tear of meniscus of knee 8/29/2019    Knee scoped to fix bucket handle tear     Past Surgical History:   Procedure Laterality Date    ABLATION OF DYSRHYTHMIC FOCUS  7/29/2024    PFA w/ Dr. Polanco    CARDIAC ELECTROPHYSIOLOGY PROCEDURE N/A 07/29/2024    Procedure: Ablation atrial fibrillation w/ PFA, start Eliquis 2 weeks prior, hold Flecainide 5 days prior, hold Eliquis morning of, schedule last week of July;  Surgeon: Helio Polanco MD;  Location: [facility] INVASIVE LOCATION;  Service: Cardiovascular;  Laterality: N/A;    COLONOSCOPY      KNEE SURGERY Left     scope    WISDOM  "TOOTH EXTRACTION         No Known Allergies    MEDS:  Prior to Admission medications    Medication Sig Start Date End Date Taking? Authorizing Provider   aspirin 81 MG EC tablet Take 1 tablet by mouth Daily. Resume after finished with Eliquis script. 9/29/24   Reva Reyes APRN         FH:  Family History   Problem Relation Age of Onset    Scoliosis Mother     Cancer Father         Squamous    Heart attack Paternal Grandfather         Multiple       Objective   Vital Signs:  Ht 188 cm (74.02\")   Wt 89.8 kg (198 lb)   BMI 25.41 kg/m²     BMI is >= 25 and <30. (Overweight) The following options were offered after discussion;: Working on exercise.          Physical Exam  Constitutional:       Appearance: Normal appearance.   Neurological:      Mental Status: He is alert and oriented to person, place, and time.   Psychiatric:         Mood and Affect: Mood normal.         Behavior: Behavior normal.         Thought Content: Thought content normal.         Judgment: Judgment normal.             Result Review :              Assessment and Plan  Jay Nash Jr. is a 59 y.o. male returns for follow-up and reevaluation for suspected sleep disordered breathing and obstructive sleep apnea.  Patient continues to have difficulty with snoring and has restless sleep.  He had an ablation earlier this year for atrial fibrillation.  There continues to be concerned that undiagnosed sleep apnea contributed to arrhythmia.  Patient was last seen by Dr. Beltran on 6/8/2022 at which time a home sleep test was ordered.  This has not yet been obtained.  I will place orders for HST for further evaluation.  Patient may need a third-party test as he needs to get this done prior to the end of the year.    Diagnoses and all orders for this visit:    1. Sleep apnea, unspecified type (Primary)  -     Cancel: Home Sleep Study; Future  -     Home Sleep Study; Future    2. Snoring  -     Cancel: Home Sleep Study; Future  -     Home Sleep " Study; Future    3. Excessive daytime sleepiness  -     Cancel: Home Sleep Study; Future  -     Home Sleep Study; Future    4. Overweight with body mass index (BMI) 25.0-29.9              Follow Up  Return for Follow up after study.  Patient was given instructions and counseling regarding his condition or for health maintenance advice. Please see specific information pulled into the AVS if appropriate.       MERA Flores, ACNP-BC  Pulmonology, Critical Care, and Sleep Medicine

## 2024-11-12 ENCOUNTER — TELEMEDICINE (OUTPATIENT)
Dept: SLEEP MEDICINE | Age: 59
End: 2024-11-12
Payer: COMMERCIAL

## 2024-11-12 VITALS — BODY MASS INDEX: 25.41 KG/M2 | HEIGHT: 74 IN | WEIGHT: 198 LBS

## 2024-11-12 DIAGNOSIS — G47.19 EXCESSIVE DAYTIME SLEEPINESS: ICD-10-CM

## 2024-11-12 DIAGNOSIS — G47.30 SLEEP APNEA, UNSPECIFIED TYPE: Primary | ICD-10-CM

## 2024-11-12 DIAGNOSIS — R06.83 SNORING: ICD-10-CM

## 2024-11-12 DIAGNOSIS — E66.3 OVERWEIGHT WITH BODY MASS INDEX (BMI) 25.0-29.9: ICD-10-CM

## 2024-11-12 PROCEDURE — 99213 OFFICE O/P EST LOW 20 MIN: CPT | Performed by: NURSE PRACTITIONER

## 2024-12-31 ENCOUNTER — HOSPITAL ENCOUNTER (OUTPATIENT)
Dept: SLEEP MEDICINE | Facility: HOSPITAL | Age: 59
Discharge: HOME | End: 2024-12-31
Admitting: NURSE PRACTITIONER
Payer: COMMERCIAL

## 2024-12-31 VITALS — BODY MASS INDEX: 25.41 KG/M2 | HEIGHT: 74 IN | WEIGHT: 198 LBS

## 2024-12-31 DIAGNOSIS — G47.30 SLEEP APNEA, UNSPECIFIED TYPE: ICD-10-CM

## 2024-12-31 DIAGNOSIS — G47.19 EXCESSIVE DAYTIME SLEEPINESS: ICD-10-CM

## 2024-12-31 DIAGNOSIS — R06.83 SNORING: ICD-10-CM

## 2024-12-31 PROCEDURE — 95800 SLP STDY UNATTENDED: CPT

## 2025-01-02 DIAGNOSIS — G47.33 OSA (OBSTRUCTIVE SLEEP APNEA): Primary | ICD-10-CM

## 2025-01-02 DIAGNOSIS — R06.83 SNORING: ICD-10-CM

## 2025-01-08 ENCOUNTER — TELEMEDICINE (OUTPATIENT)
Dept: SLEEP MEDICINE | Age: 60
End: 2025-01-08
Payer: COMMERCIAL

## 2025-01-08 VITALS — HEIGHT: 74 IN | WEIGHT: 204 LBS | BODY MASS INDEX: 26.18 KG/M2

## 2025-01-08 DIAGNOSIS — E66.3 OVERWEIGHT WITH BODY MASS INDEX (BMI) 25.0-29.9: ICD-10-CM

## 2025-01-08 DIAGNOSIS — G47.33 OBSTRUCTIVE SLEEP APNEA, ADULT: Primary | ICD-10-CM

## 2025-01-08 NOTE — PROGRESS NOTES
Sleep Clinic Video Visit Follow Up Note    The patient is located at their home address in [place], [place]. The patient presents today for telehealth service.  This service was conducted via audio/video technology through a secure [employer] video visit connection through Epic.  This provider is located in [facility].  Patient stated they are in a secure environment for the session.  Patient's condition being diagnosed/treated is appropriate for telemedicine.  The provider identified himself as well as his credentials.  The patient, and/or patient's guardian, consent to be seen remotely, and when consent is given they understanding that the consent allows for patient identifiable information to be sent to a third-party as needed.  They may refuse to be seen remotely at any time.  The electronic data is encrypted and password protected, and the patient and/or guardian has been advised of the potential risk to privacy not withstanding such measures.  Patient identifiers used: Name and date of birth.     You have chosen to receive care through a telehealth visit.  Do you consent to use a video/audio connection for your medical care today? Yes    Mode of Visit: Video  Location of patient: -HOME-  Location of provider: +[facility]+  You have chosen to receive care through a telehealth visit.  The patient has signed the video visit consent form.  The visit included audio and video interaction. No technical issues occurred during this visit.      Chief Complaint  Sleep problems, follow-up sleep study    Subjective     History of Present Illness (from previous encounter on 11/12/2024):  Jay Nash Jr. is a 59 y.o. male returns for follow-up and reevaluation for suspected sleep disordered breathing and obstructive sleep apnea.  Patient continues to have difficulty with snoring and has restless sleep.  He had an ablation earlier this year for atrial fibrillation.  There continues to be concerned that undiagnosed  sleep apnea contributed to arrhythmia.  Patient was last seen by Dr. Beltran on 6/8/2022 at which time a home sleep test was ordered.  This has not yet been obtained.  I will place orders for HST for further evaluation.  Patient may need a third-party test as he needs to get this done prior to the end of the year. (End copied text)    -A home sleep test was obtained on 1/1/2025 revealing mild obstructive sleep apnea with an AHI of 13.2/h.     Interval History:  Jay Nash Jr. is a 59 y.o. male who presents for follow-up after home sleep test.  The patient was found with mild obstructive sleep apnea with an AHI of 13.2/h.      Further details are as follows:    Proctor Scale is: 4/24    Weight:    Current Weight: 204 lbs    Weight change in the last year:  gain: 5 lbs    The patient's relevant past medical, surgical, family, and social history reviewed and updated in Epic as appropriate.    PMH:    Past Medical History:   Diagnosis Date    Abnormal heart rhythm     Atrial fibrillation     Fracture, tibia and fibula Years ago    Neuroma of foot About a year or so ago    Why I am coming to you today    Tear of meniscus of knee 8/29/2019    Knee scoped to fix bucket handle tear     Past Surgical History:   Procedure Laterality Date    ABLATION OF DYSRHYTHMIC FOCUS  7/29/2024    PFA w/ Dr. Polanco    CARDIAC ELECTROPHYSIOLOGY PROCEDURE N/A 07/29/2024    Procedure: Ablation atrial fibrillation w/ PFA, start Eliquis 2 weeks prior, hold Flecainide 5 days prior, hold Eliquis morning of, schedule last week of July;  Surgeon: Helio Polanco MD;  Location: [facility] INVASIVE LOCATION;  Service: Cardiovascular;  Laterality: N/A;    COLONOSCOPY      KNEE SURGERY Left     scope    WISDOM TOOTH EXTRACTION         No Known Allergies    MEDS:  Prior to Admission medications    Medication Sig Start Date End Date Taking? Authorizing Provider   aspirin 81 MG EC tablet Take 1 tablet by mouth Daily. Resume after finished with  "Anyi script. 9/29/24   Reva Reyes APRN         FH:  Family History   Problem Relation Age of Onset    Scoliosis Mother     Cancer Father         Squamous    Heart attack Paternal Grandfather         Multiple       Objective   Vital Signs:  Ht 188 cm (74.02\")   Wt 92.5 kg (204 lb)   BMI 26.18 kg/m²                Physical Exam  Constitutional:       Appearance: Normal appearance.   Neurological:      Mental Status: He is alert and oriented to person, place, and time.   Psychiatric:         Mood and Affect: Mood normal.         Behavior: Behavior normal.         Thought Content: Thought content normal.         Judgment: Judgment normal.             Result Review :              Assessment and Plan  Jay Nash Jr. is a 59 y.o. male returns for follow-up after home sleep test.  The patient was found with mild obstructive sleep apnea with an AHI of 13.2/h.  Recommendation is for trial of PAP therapy.  I have discussed alternatives including MAD, and surgical consult. I will place orders for new device and supplies and mask the patient return for follow-up in compliance in 31-90 days.  I have noted the patient will need to use the device more than 4 hours a night, more than 70% of the time in order to remain fully compliant.    Diagnoses and all orders for this visit:    1. Obstructive sleep apnea, adult (Primary)  -     PAP Therapy    2. Overweight with body mass index (BMI) 25.0-29.9              Follow Up  Return for 31 to 90 days after PAP setup.  Patient was given instructions and counseling regarding his condition or for health maintenance advice. Please see specific information pulled into the AVS if appropriate.       MERA Flores, ACNP-BC  Pulmonology, Critical Care, and Sleep Medicine  "

## 2025-01-28 DIAGNOSIS — G47.33 OBSTRUCTIVE SLEEP APNEA, ADULT: Primary | ICD-10-CM

## 2025-01-29 ENCOUNTER — OFFICE VISIT (OUTPATIENT)
Dept: CARDIOLOGY | Facility: CLINIC | Age: 60
End: 2025-01-29
Payer: COMMERCIAL

## 2025-01-29 VITALS
HEIGHT: 74 IN | WEIGHT: 211.8 LBS | SYSTOLIC BLOOD PRESSURE: 108 MMHG | DIASTOLIC BLOOD PRESSURE: 76 MMHG | BODY MASS INDEX: 27.18 KG/M2 | OXYGEN SATURATION: 95 % | HEART RATE: 58 BPM

## 2025-01-29 DIAGNOSIS — I49.3 PVC (PREMATURE VENTRICULAR CONTRACTION): ICD-10-CM

## 2025-01-29 DIAGNOSIS — I48.0 PAROXYSMAL ATRIAL FIBRILLATION: Primary | ICD-10-CM

## 2025-01-29 NOTE — PROGRESS NOTES
"Jay Nash Jr.  1965  [phone]    01/29/2025    [facility] CARDIOLOGY     Referring Provider: No ref. provider found     Augusto Chaney MD  [address] [address]    Chief Complaint   Patient presents with    Paroxysmal atrial fibrillation       Problem List:   Paroxysmal atrial fibrillation  CHADSVASc = 0, on ASA  24-hour Holter monitor 1/30/2018: Predominantly normal sinus rhythm, average heart rate 58 bpm ( bpm)   2 weeks Zio patch 7/5/2018 - 3% AF burden, average HR 53 bpm ( bpm), 5 beat run of NSVT  GXT 09/2018 - reportedly negative for ischemia - data deficit  Initiated on pill-in-pocket Flecainide  Echocardiogram 7/30/18: EF 60%, no significant valvular disease  12 day holter monitor 9/2022: NSR, no afib  Pulse field ablation of pulmonary veins and left atrial posterior wall 7/29/2024  Echocardiogram 7/24/2024: EF 56 to 60%  PVC/NSVT  Noted on Zio patch, 07/2018, 5 beat run of NSVT  BPH  DAX - on CPAP.   Tubular adenoma of rectum  Surgical history  Oral surgery    Allergies  No Known Allergies    Current Medications    Current Outpatient Medications:     aspirin 81 MG EC tablet, Take 1 tablet by mouth Daily. Resume after finished with Eliquis script., Disp: , Rfl:     History of Present Illness:     Pt presents for follow up of AF/PVCs. Since we last saw the pt, pt denies any AF episodes, SOB, CP, LH, and dizziness. Denies any hospitalizations, ER visits, bleeding, or TIA/CVA symptoms. Overall feels well.    ROS:  General:  Denies fatigue, weight gain or loss  Cardiovascular:  Denies CP, PND, syncope, near syncope, edema or palpitations.  Pulmonary:  Denies VILLALBA, cough, or wheezing      Vitals:    01/29/25 1514   BP: 108/76   BP Location: Left arm   Patient Position: Sitting   Cuff Size: Adult   Pulse: 58   SpO2: 95%   Weight: 96.1 kg (211 lb 12.8 oz)   Height: 188 cm (74\")     Body mass index is 27.19 kg/m².  PE:  General: NAD  Neck: no " JVD, no carotid bruits, no TM  Heart RRR, NL S1, S2, S4 present, no rubs, murmurs  Lungs: CTA, no wheezes, rhonchi, or rales  Abd: soft, non-tender, NL BS  Ext: No musculoskeletal deformities, no edema, cyanosis, or clubbing  Psych: normal mood and affect    Diagnostic Data:        ECG 12 Lead    Date/Time: 1/29/2025 3:43 PM  Performed by: Carmelina Zimmer PA    Authorized by: Carmelina Zimmer PA  Comparison: compared with previous ECG from 10/30/2024  Similar to previous ECG  Rhythm: sinus bradycardia  BPM: 58                 1. Paroxysmal atrial fibrillation    2. PVC (premature ventricular contraction)          Plan:  1. PAF status post PFA of the pulmonary veins approximate 6 months ago overall doing very well with no clinical recurrence.   On ASA daily.     2. PVC: stable, no symptoms      3. Bradycardia- asymptomatic    F/up in 6 months    Electronically signed by MICHAEL Abel, 01/29/25, 3:47 PM EST.

## 2025-03-14 ENCOUNTER — TELEMEDICINE (OUTPATIENT)
Dept: SLEEP MEDICINE | Age: 60
End: 2025-03-14
Payer: COMMERCIAL

## 2025-03-14 VITALS — BODY MASS INDEX: 26.18 KG/M2 | WEIGHT: 204 LBS | HEIGHT: 74 IN

## 2025-03-14 DIAGNOSIS — E66.3 OVERWEIGHT WITH BODY MASS INDEX (BMI) 25.0-29.9: ICD-10-CM

## 2025-03-14 DIAGNOSIS — G47.33 OBSTRUCTIVE SLEEP APNEA, ADULT: Primary | ICD-10-CM

## 2025-03-14 NOTE — PROGRESS NOTES
Sleep Clinic Video Visit Follow Up Note    The patient is located at their work address in [place]. The patient presents today for telehealth service.  This service was conducted via audio/video technology through a secure [employer] video visit connection through Epic.  This provider is located in [facility].  Patient stated they are in a secure environment for the session.  Patient's condition being diagnosed/treated is appropriate for telemedicine.  The provider identified himself as well as his credentials.  The patient, and/or patient's guardian, consent to be seen remotely, and when consent is given they understanding that the consent allows for patient identifiable information to be sent to a third-party as needed.  They may refuse to be seen remotely at any time.  The electronic data is encrypted and password protected, and the patient and/or guardian has been advised of the potential risk to privacy not withstanding such measures.  Patient identifiers used: Name and date of birth.     You have chosen to receive care through a telehealth visit.  Do you consent to use a video connection for your medical care today? Yes     Mode of Visit: Video  Location of patient: -WORK-  Location of provider: +[facility]+  You have chosen to receive care through a telehealth visit.  The patient has signed the video visit consent form.  The visit included audio and video interaction. No technical issues occurred during this visit.      Chief Complaint  Follow-up and compliance of PAP therapy    Subjective     History of Present Illness (from previous encounter on 1/8/2025):  Jay Nash Jr. is a 59 y.o. male returns for follow-up after home sleep test.  The patient was found with mild obstructive sleep apnea with an AHI of 13.2/h.  Recommendation is for trial of PAP therapy.  I have discussed alternatives including MAD, and surgical consult. I will place orders for new device and supplies and mask the patient  return for follow-up in compliance in 31-90 days.  I have noted the patient will need to use the device more than 4 hours a night, more than 70% of the time in order to remain fully compliant. (End copied text)    Interval History:  Jay Nash Jr. is a 59 y.o. male returns for follow up and compliance of PAP therapy. The patient was last seen on 1/8/2025 by me. Overall the patient feels good with regard to therapy. The device appears to be working appropriately. On average the patient sleeps 7.5 hours per night. The patient wake 1-2 times per night.     The patient reports the following changes to their medical and medication history since they were last seen:  None    Further details are as follows:    [place] Scale is: 4/24      Weight:  Current Weight: 204 lb    Weight change in the last year:  gain: 0 lbs    The patient's relevant past medical, surgical, family, and social history reviewed and updated in Epic as appropriate.    PMH:    Past Medical History:   Diagnosis Date    Abnormal heart rhythm     Atrial fibrillation     Fracture, tibia and fibula Years ago    Neuroma of foot About a year or so ago    Why I am coming to you today    Tear of meniscus of knee 8/29/2019    Knee scoped to fix bucket handle tear     Past Surgical History:   Procedure Laterality Date    ABLATION OF DYSRHYTHMIC FOCUS  7/29/2024    PFA w/ Dr. Polanco    CARDIAC ELECTROPHYSIOLOGY PROCEDURE N/A 07/29/2024    Procedure: Ablation atrial fibrillation w/ PFA, start Eliquis 2 weeks prior, hold Flecainide 5 days prior, hold Eliquis morning of, schedule last week of July;  Surgeon: Helio Polanco MD;  Location: [facility] INVASIVE LOC[facility];  Service: Cardiovascular;  Laterality: N/A;    COLONOSCOPY      KNEE SURGERY Left     scope    WISDOM TOOTH EXTRACTION         No Known Allergies    MEDS:  Prior to Admission medications    Medication Sig Start Date End Date Taking? Authorizing Provider   aspirin 81 MG EC tablet Take 1 tablet by  "mouth Daily. Resume after finished with Eliquis script. 9/29/24   Reva Reyes APRN         FH:  Family History   Problem Relation Age of Onset    Scoliosis Mother     Cancer Father         Squamous    Heart attack Paternal Grandfather         Multiple       Objective   Vital Signs:  Ht 188 cm (74.02\")   Wt 92.5 kg (204 lb)   BMI 26.18 kg/m²     Patient's (Body mass index is 26.18 kg/m².) indicates that they are overweight (BMI 25-29.9)          Physical Exam  Constitutional:       Appearance: Normal appearance.   Neurological:      Mental Status: He is alert and oriented to person, place, and time.   Psychiatric:         Mood and Affect: Mood normal.         Behavior: Behavior normal.         Thought Content: Thought content normal.         Judgment: Judgment normal.               Result Review :           PAP Report:  AHI: 0.3/h  Days of Usage: 45/45 (100%)  95th Percentile Pressure: 11.4 cm H20  95th percentile leaks: 19.9 L/min  Number of Days Greater than 4 hours: 40/45 (98%)  Settings: Auto CPAP-8/14 cm H2O, EPR full-time, EPR level 2, response standard.       Assessment and Plan  Jay Nash Jr. is a 59 y.o. male who returns for follow-up and compliance of PAP therapy.  The pap report has been reviewed.  Overall usage is at 100% with compliance at 98%.  Patient averages 7 hours and 8 minutes of therapy.  Sleep apnea is well-controlled with an AHI of 0.3/h.  He has a history of mild HSA with an initial AHI of 13.2/h. I will refill the patient's supplies, and they will return for follow-up and compliance in 1 year or sooner should they have further questions or concerns.      Diagnoses and all orders for this visit:    1. Obstructive sleep apnea, adult (Primary)  -     PAP Therapy    2. Overweight with body mass index (BMI) 25.0-29.9         The patient continues to use and benefit from PAP therapy.    1. The patient was counseled regarding multimodal approach with healthy nutrition, healthy " sleep, regular physical activity, social activities, counseling, and medications. Encouraged to practice lateral sleep position. Avoid alcohol and sedatives close to bedtime.     2.  We will refill supplies x1 year.  Return to clinic 1 year or sooner if symptoms warrant.  Patient gave verbal consent today for video visit. I have reviewed the results of my evaluation and impression and discussed my recommendations in detail with the patient.         Follow Up  Return in about 1 year (around 3/14/2026) for Annual visit, Video visit.  Patient was given instructions and counseling regarding his condition or for health maintenance advice. Please see specific information pulled into the AVS if appropriate.       MERA Flores, ACNP-BC  Pulmonology, Critical Care, and Sleep Medicine

## 2025-04-21 ENCOUNTER — TELEPHONE (OUTPATIENT)
Dept: SLEEP MEDICINE | Age: 60
End: 2025-04-21
Payer: COMMERCIAL

## 2025-04-21 DIAGNOSIS — G47.33 OBSTRUCTIVE SLEEP APNEA, ADULT: Primary | ICD-10-CM

## 2025-04-21 NOTE — TELEPHONE ENCOUNTER
Left patient a voice message to call us at the office so we could answer his questions regarding his cpap.

## 2025-07-30 ENCOUNTER — OFFICE VISIT (OUTPATIENT)
Dept: CARDIOLOGY | Facility: CLINIC | Age: 60
End: 2025-07-30
Payer: COMMERCIAL

## 2025-07-30 VITALS
BODY MASS INDEX: 26.82 KG/M2 | OXYGEN SATURATION: 96 % | HEIGHT: 74 IN | HEART RATE: 53 BPM | SYSTOLIC BLOOD PRESSURE: 126 MMHG | WEIGHT: 209 LBS | DIASTOLIC BLOOD PRESSURE: 80 MMHG

## 2025-07-30 DIAGNOSIS — I49.3 PVC (PREMATURE VENTRICULAR CONTRACTION): ICD-10-CM

## 2025-07-30 DIAGNOSIS — I48.0 PAROXYSMAL ATRIAL FIBRILLATION: Primary | ICD-10-CM

## 2025-07-30 DIAGNOSIS — R00.1 BRADYCARDIA, SINUS: ICD-10-CM

## 2025-07-30 NOTE — PROGRESS NOTES
"Jay Nash Jr.  1965  Home phone not available    07/30/2025    [facility] CARDIOLOGY     Referring Provider: No ref. provider found     Augusto Chaney MD  [address] [address]    Chief Complaint   Patient presents with    PAF (paroxysmal atrial fibrillation)       Problem List:   Paroxysmal atrial fibrillation  CHADSVASc = 0, on ASA  24-hour Holter monitor 1/30/2018: Predominantly normal sinus rhythm, average heart rate 58 bpm ( bpm)   2 weeks Zio patch 7/5/2018 - 3% AF burden, average HR 53 bpm ( bpm), 5 beat run of NSVT  GXT 09/2018 - reportedly negative for ischemia - data deficit  Initiated on pill-in-pocket Flecainide  Echocardiogram 7/30/18: EF 60%, no significant valvular disease  12 day holter monitor 9/2022: NSR, no afib  Pulse Field ablation of pulmonary veins and left atrial posterior wall 7/29/2024  Echocardiogram 7/24/2024: EF 56 to 60%  PVC/NSVT  Noted on Zio patch, 07/2018, 5 beat run of NSVT  BPH  DAX - on CPAP.   Tubular adenoma of rectum  Surgical history  Oral surgery    Allergies  No Known Allergies    Current Medications    Current Outpatient Medications:     aspirin 81 MG EC tablet, Take 1 tablet by mouth Daily. Resume after finished with Eliquis script., Disp: , Rfl:     History of Present Illness     Pt presents for follow up of PAF/PVC. Since we last saw the pt, pt denies any AF episodes, SOB, CP, LH, and dizziness. Denies any hospitalizations, ER visits, bleeding, or TIA/CVA symptoms. Overall feels well.  Blood pressure has been well-controlled.        Vitals:    07/30/25 1610   BP: 126/80   BP Location: Right arm   Patient Position: Sitting   Pulse: 53   SpO2: 96%   Weight: 94.8 kg (209 lb)   Height: 188 cm (74\")     Body mass index is 26.83 kg/m².  PE:  General: NAD  Neck: no JVD, no carotid bruits, no TM  Heart RRR, NL S1, S2, S4 present, no rubs, murmurs  Lungs: CTA, no wheezes, rhonchi, or rales  Abd: soft, " non-tender, NL BS  Ext: No musculoskeletal deformities, no edema, cyanosis, or clubbing  Psych: normal mood and affect    Diagnostic Data:        ECG 12 Lead    Date/Time: 7/30/2025 4:15 PM  Performed by: Helio Polanco MD    Authorized by: Helio Polanco MD  Comparison: compared with previous ECG from 1/29/2025  Similar to previous ECG  Rhythm: sinus bradycardia  BPM: 45              1. Paroxysmal atrial fibrillation    2. PVC (premature ventricular contraction)    3. Bradycardia, sinus          Plan:    1. PAF status post PFA of the pulmonary veins approximate 12 months ago overall doing very well with no clinical recurrence.   On ASA daily.     2. PVC: stable, no symptoms      3. Bradycardia- asymptomatic    F/up prn

## (undated) DEVICE — KT MANIFLD EP

## (undated) DEVICE — STEERABLE SHEATH CLEAR: Brand: FARADRIVE™

## (undated) DEVICE — GW INQWIRE ROSEN/TIP PTFE FC J/TP/1.5MM 0.035IN 180CM

## (undated) DEVICE — ST INF PRI SMRTSTE 20DRP 2VLV 24ML 117

## (undated) DEVICE — ST EXT IV SMARTSITE PINCH/CLMP 5ML 46CM

## (undated) DEVICE — ST EXT IV SMRTSTE 2VLV FIX M LL 6ML 41

## (undated) DEVICE — DECANT BG O JET

## (undated) DEVICE — LEX ELECTRO PHYSIOLOGY: Brand: MEDLINE INDUSTRIES, INC.

## (undated) DEVICE — SYS CLS VASC/VENI VASCADE MVP 6TO12F

## (undated) DEVICE — SET PRIMARY GRVTY 10DP MALE LL 104IN

## (undated) DEVICE — SOL NACL 0.9PCT 1000ML

## (undated) DEVICE — Device: Brand: REFERENCE PATCH CARTO 3

## (undated) DEVICE — PULSED FIELD ABLATION CATHETER: Brand: FARAWAVE™

## (undated) DEVICE — Device: Brand: SOUNDSTAR

## (undated) DEVICE — CATHETER CONNECTION CABLE: Brand: FARASTAR™

## (undated) DEVICE — INTRO SHEATH FAST/CATH LG/LUM 11F .038IN 12CM

## (undated) DEVICE — ADULT, W/LG. BACK PAD, RADIOTRANSPARENT ELEMENT AND LEAD WIRE COMPATIBLE W/: Brand: DEFIBRILLATION ELECTRODES

## (undated) DEVICE — TBG PRESS MON 48IN M/F L/L: Brand: MEDLINE INDUSTRIES, INC.

## (undated) DEVICE — Device: Brand: PENTARAY NAV

## (undated) DEVICE — PRESSURE MONITORING SET: Brand: TRUWAVE

## (undated) DEVICE — INTRO SHEATH ENGAGE W/50 GW .038 7F12

## (undated) DEVICE — Device: Brand: MEDEX

## (undated) DEVICE — ACQGUIDE MINI-S, 65CM - L2 WITH ACQCROSS QX: Brand: ACQGUIDE MINI-S

## (undated) DEVICE — DOME MONITORING W BONDED STPCK BIOTRANS2

## (undated) DEVICE — Device: Brand: VIZIGO

## (undated) DEVICE — Device: Brand: WEBSTER CS